# Patient Record
Sex: FEMALE | Race: WHITE | Employment: OTHER | RURAL
[De-identification: names, ages, dates, MRNs, and addresses within clinical notes are randomized per-mention and may not be internally consistent; named-entity substitution may affect disease eponyms.]

---

## 2017-02-16 ENCOUNTER — OFFICE VISIT (OUTPATIENT)
Dept: FAMILY MEDICINE CLINIC | Age: 82
End: 2017-02-16

## 2017-02-16 VITALS
RESPIRATION RATE: 20 BRPM | HEIGHT: 62 IN | WEIGHT: 169 LBS | BODY MASS INDEX: 31.1 KG/M2 | TEMPERATURE: 97.1 F | DIASTOLIC BLOOD PRESSURE: 84 MMHG | SYSTOLIC BLOOD PRESSURE: 138 MMHG | HEART RATE: 68 BPM | OXYGEN SATURATION: 94 %

## 2017-02-16 DIAGNOSIS — Z00.00 ROUTINE GENERAL MEDICAL EXAMINATION AT A HEALTH CARE FACILITY: ICD-10-CM

## 2017-02-16 DIAGNOSIS — Z13.39 SCREENING FOR ALCOHOLISM: ICD-10-CM

## 2017-02-16 DIAGNOSIS — Z23 ENCOUNTER FOR IMMUNIZATION: Primary | ICD-10-CM

## 2017-02-16 NOTE — PATIENT INSTRUCTIONS
A Healthy Lifestyle: Care Instructions  Your Care Instructions  A healthy lifestyle can help you feel good, stay at a healthy weight, and have plenty of energy for both work and play. A healthy lifestyle is something you can share with your whole family. A healthy lifestyle also can lower your risk for serious health problems, such as high blood pressure, heart disease, and diabetes. You can follow a few steps listed below to improve your health and the health of your family. Follow-up care is a key part of your treatment and safety. Be sure to make and go to all appointments, and call your doctor if you are having problems. Its also a good idea to know your test results and keep a list of the medicines you take. How can you care for yourself at home? · Do not eat too much sugar, fat, or fast foods. You can still have dessert and treats now and then. The goal is moderation. · Start small to improve your eating habits. Pay attention to portion sizes, drink less juice and soda pop, and eat more fruits and vegetables. ¨ Eat a healthy amount of food. A 3-ounce serving of meat, for example, is about the size of a deck of cards. Fill the rest of your plate with vegetables and whole grains. ¨ Limit the amount of soda and sports drinks you have every day. Drink more water when you are thirsty. ¨ Eat at least 5 servings of fruits and vegetables every day. It may seem like a lot, but it is not hard to reach this goal. A serving or helping is 1 piece of fruit, 1 cup of vegetables, or 2 cups of leafy, raw vegetables. Have an apple or some carrot sticks as an afternoon snack instead of a candy bar. Try to have fruits and/or vegetables at every meal.  · Make exercise part of your daily routine. You may want to start with simple activities, such as walking, bicycling, or slow swimming. Try to be active 30 to 60 minutes every day. You do not need to do all 30 to 60 minutes all at once.  For example, you can exercise 3 times a day for 10 or 20 minutes. Moderate exercise is safe for most people, but it is always a good idea to talk to your doctor before starting an exercise program.  · Keep moving. Ramesh Northern the lawn, work in the garden, or Nanali. Take the stairs instead of the elevator at work. · If you smoke, quit. People who smoke have an increased risk for heart attack, stroke, cancer, and other lung illnesses. Quitting is hard, but there are ways to boost your chance of quitting tobacco for good. ¨ Use nicotine gum, patches, or lozenges. ¨ Ask your doctor about stop-smoking programs and medicines. ¨ Keep trying. In addition to reducing your risk of diseases in the future, you will notice some benefits soon after you stop using tobacco. If you have shortness of breath or asthma symptoms, they will likely get better within a few weeks after you quit. · Limit how much alcohol you drink. Moderate amounts of alcohol (up to 2 drinks a day for men, 1 drink a day for women) are okay. But drinking too much can lead to liver problems, high blood pressure, and other health problems. Family health  If you have a family, there are many things you can do together to improve your health. · Eat meals together as a family as often as possible. · Eat healthy foods. This includes fruits, vegetables, lean meats and dairy, and whole grains. · Include your family in your fitness plan. Most people think of activities such as jogging or tennis as the way to fitness, but there are many ways you and your family can be more active. Anything that makes you breathe hard and gets your heart pumping is exercise. Here are some tips:  ¨ Walk to do errands or to take your child to school or the bus. ¨ Go for a family bike ride after dinner instead of watching TV. Where can you learn more? Go to http://ebony-fatoumata.info/. Enter A330 in the search box to learn more about \"A Healthy Lifestyle: Care Instructions. \"  Current as of: July 26, 2016  Content Version: 11.1  © 4880-6890 [employer], Use[employer]ogether. Care instructions adapted under license by [employer] ([employer]) (which disclaims liability or warranty for this information). If you have questions about a medical condition or this instruction, always ask your healthcare professional. [address] any warranty or liability for your use of this information. Medicare Wellness Visit, Female    The best way to live healthy is to have a healthy lifestyle by eating a well-balanced diet, exercising regularly, limiting alcohol and stopping smoking. Regular physical exams and screening tests are another way to keep healthy. Preventive exams provided by your health care provider can find health problems before they become diseases or illnesses. Preventive services including immunizations, screening tests, monitoring and exams can help you take care of your own health. All people over age 72 should have a pneumovax  and and a prevnar shot to prevent pneumonia. These are once in a lifetime unless you and your provider decide differently. All people over 65 should have a yearly flu shot and a tetanus vaccine every 10 years. A bone mass density to screen for osteoporosis or thinning of the bones should be done every 2 years after 65. Screening for diabetes mellitus with a blood sugar test should be done every year. Glaucoma is a disease of the eye due to increased ocular pressure that can lead to blindness and it should be done every year by an eye professional.    Cardiovascular screening tests that check for elevated lipids (fatty part of blood) which can lead to heart disease and strokes should be done every 5 years. Colorectal screening that evaluates for blood or polyps in your colon should be done yearly as a stool test or every five years as a flexible sigmoidoscope or every 10 years as a colonoscopy up to age 76.     Breast cancer screening with a mammogram is recommended biennially  for women age 54-69. Screening for cervical cancer with a pap smear and pelvic exam is recommended for women after age 72 years every 2 years up to age 79 or when the provider and patient decide to stop. If there is a history of cervical abnormalities or other increased risk for cancer then the test is recommended yearly. Hepatitis C screening is also recommended for anyone born between [address] [place] [address] through [place] [address]. A shingles vaccine is also recommended once in a lifetime after age 61. Your Medicare Wellness Exam is recommended annually.     Here is a list of your current Health Maintenance items with a due date:  Health Maintenance Due   Topic Date Due    DTaP/Tdap/Td  (1 - Tdap) 03/16/1956    Shingles Vaccine  03/16/1995    Bone Density Screening  03/16/2000    Annual Well Visit  03/16/2000    Flu Vaccine  08/01/2016

## 2017-02-16 NOTE — PROGRESS NOTES
This is a Subsequent Medicare Annual Wellness Visit providing Personalized Prevention Plan Services (PPPS) (Performed 12 months after initial AWV and PPPS )    I have reviewed the patient's medical history in detail and updated the computerized patient record. History     Past Medical History   Diagnosis Date    Anemia     Arthritis     GERD (gastroesophageal reflux disease)     HLD (hyperlipidemia)     Hypertension     Stroke ([facility] [address]     Thyroid disease      thinks had partial resection? Not on meds and normal TSH 10/2016      Past Surgical History   Procedure Laterality Date    Hx hysterectomy       Can't remember why but is sure she never had cancer. Current Outpatient Prescriptions   Medication Sig Dispense Refill    atorvastatin (LIPITOR) 40 mg tablet Take 1 Tab by mouth daily. 30 Tab 5    carvedilol (COREG) 6.25 mg tablet Take 1 Tab by mouth two (2) times daily (with meals). 60 Tab 5    pantoprazole (PROTONIX) 40 mg tablet Take 1 Tab by mouth daily. 30 Tab 5    amLODIPine (NORVASC) 10 mg tablet Take 1 Tab by mouth nightly. 30 Tab 5    ascorbic acid (VITAMIN C) 250 mg tablet Take 1 Tab by mouth two (2) times daily (with meals). 60 Tab 3    aspirin 81 mg chewable tablet Take 1 Tab by mouth daily. 100 Tab 3    ferrous sulfate 325 mg (65 mg iron) tablet Take 1 Tab by mouth two (2) times daily (with meals). 60 Tab 3    therapeutic multivitamin (THERAGRAN) tablet Take 1 Tab by mouth daily.  acetaminophen (TYLENOL ARTHRITIS PAIN) 650 mg CR tablet Take 650 mg by mouth every six (6) hours as needed for Pain.        No Known Allergies  Family History   Problem Relation Age of Onset    Cancer Mother      colon    Alzheimer Mother     Cancer Father      bladder    Cancer Sister      not sure what kind    Cancer Brother      not sure what kind    Heart Disease Maternal Grandmother     Stroke Other      at young age     Social History   Substance Use Topics    Smoking status: Never Smoker    Smokeless tobacco: Never Used    Alcohol use No     Patient Active Problem List   Diagnosis Code    GERD (gastroesophageal reflux disease) K21.9    Hypertension I10    Arthritis M19.90    GI bleed K92.2    Symptomatic anemia D64.9    Cerebral thrombosis with cerebral infarction ([facility] [address] I63.30    Right hemiparesis ([facility] [address] G81.91    On statin therapy Z79.899    Antiplatelet or antithrombotic long-term use Z79.02       Depression Risk Factor Screening:     PHQ 2 / 9, over the last two weeks 2017   Little interest or pleasure in doing things Not at all   Feeling down, depressed or hopeless Not at all   Total Score PHQ 2 0     Alcohol Risk Factor Screening: On any occasion during the past 3 months, have you had more than 3 drinks containing alcohol? No    Do you average more than 7 drinks per week? No      Functional Ability and Level of Safety:     Hearing Loss   none    Activities of Daily Living   Partial assistance.  helps her since her stroke. Requires assistance with: ambulation, bathing and hygiene and dressing    Fall Risk     Fall Risk Assessment, last 12 mths 2017   Able to walk? Yes   Fall in past 12 months? Yes   Fall with injury? No   Number of falls in past 12 months 1   Fall Risk Score 1     Abuse Screen   Patient is not abused    Physical Examination     Evaluation of Cognitive Function:  Mood/affect: \"so-so\"/appropriate - she appears happier and more interactive today than she has been in the past. She does report that her dog  last week. Appearance: age appropriate, well dressed and within normal Limits  Family member/caregiver input: Some problems getting her to eat healthy and getting her to exercise. These have been long-standing problems and we have been counseling her in regards to this at every visit. Health Maintenance:  Last mammogram: Can't remember, not interested.   thinks she has not had one in 50 years at least.   Last pap: No longer a candidate, hysterectomy for benign reasons  DEXA: Never, not interested  [place], [place]. Not interested in repeating. TDaP: Can't remember, not interested  PPSV23: 2014  PCV13: Never, not interested  Zostavax: Never, not interested  Flu: 2015, not interested      Patient Care Team:  Bam Glass MD as PCP - General (Family Practice)  Ross Seay MD (Neurology)    Advice/Referrals/Counseling   Education and counseling provided:  Are appropriate based on today's review and evaluation  End-of-Life planning (with patient's consent) - Pt's  states they have an advanced directive and medical POA, will be documents to next visit. Pneumococcal Vaccine  Influenza Vaccine  Screening Mammography  Bone mass measurement (DEXA) - strongly encouraged this test given her age, hysterectomy at an early age (thinks in her 29's) and race. She will continue to think about it. Assessment/Plan   79yo F for annual [address]  - Rx for TDaP and PCV13 given today  - Pt to call if she changes her mind about wanting to schedule a DEXA or mammogram  - RTC in 2 months for f/u chronic conditions.  Will bring copy of advanced directive to that visit

## 2017-02-16 NOTE — MR AVS SNAPSHOT
Visit Information Date & Time Provider Department Dept. Phone Encounter #  
 2/16/2017 10:20 AM Jerson Saunders MD Radha Slater [id number] Follow-up Instructions Return in 2 months (on 4/16/2017). Upcoming Health Maintenance Date Due DTaP/Tdap/Td series (1 - Tdap) 3/16/1956 ZOSTER VACCINE AGE 60> 3/16/1995 OSTEOPOROSIS SCREENING (DEXA) 3/16/2000 MEDICARE YEARLY EXAM 3/16/2000 INFLUENZA AGE 9 TO ADULT 8/1/2016 GLAUCOMA SCREENING Q2Y 8/20/2017 Pneumococcal 65+ High/Highest Risk (2 of 2 - PPSV23) 2/3/2019 Allergies as of 2/16/2017  Review Complete On: 2/16/2017 By: Mykel Avelar LPN No Known Allergies Current Immunizations  Reviewed on 6/15/2015 Name Date Pneumococcal Vaccine (Unspecified Type) 2/3/2014 Not reviewed this visit You Were Diagnosed With   
  
 Codes Comments Encounter for immunization    -  Primary ICD-10-CM: O95 ICD-9-CM: V03.89 Routine general medical examination at a health care facility     ICD-10-CM: Z00.00 ICD-9-CM: V70.0 Screening for alcoholism     ICD-10-CM: Z13.89 ICD-9-CM: V79.1 Vitals BP Pulse Temp Resp Height(growth percentile) Weight(growth percentile) 138/84 (BP 1 Location: Left arm, BP Patient Position: Sitting) 68 97.1 °F (36.2 °C) (Oral) 20 5' 2\" (1.575 m) 169 lb (76.7 kg) SpO2 BMI OB Status Smoking Status 94% 30.91 kg/m2 Hysterectomy Never Smoker Vitals History BMI and BSA Data Body Mass Index Body Surface Area 30.91 kg/m 2 1.83 m 2 Preferred Pharmacy Pharmacy Name Phone [place] [place] [facility] [address] [facility] [address] [phone] Your Updated Medication List  
  
   
This list is accurate as of: 2/16/17 11:08 AM.  Always use your most recent med list. amLODIPine 10 mg tablet Commonly known as:  Lulu Del Take 1 Tab by mouth nightly. ascorbic acid (vitamin C) 250 mg tablet Commonly known as:  VITAMIN C Take 1 Tab by mouth two (2) times daily (with meals). aspirin 81 mg chewable tablet Take 1 Tab by mouth daily. atorvastatin 40 mg tablet Commonly known as:  LIPITOR Take 1 Tab by mouth daily. carvedilol 6.25 mg tablet Commonly known as:  Gordon Pintos Take 1 Tab by mouth two (2) times daily (with meals). Diphth, Pertus(Acell), Tetanus 2.5-8-5 Lf-mcg-Lf/0.5mL Susp susp Commonly known as:  BOOSTRIX TDAP  
0.5 mL by IntraMUSCular route once for 1 dose. ferrous sulfate 325 mg (65 mg iron) tablet Take 1 Tab by mouth two (2) times daily (with meals). pantoprazole 40 mg tablet Commonly known as:  PROTONIX Take 1 Tab by mouth daily. pneumococcal 13 grayson conj dip 0.5 mL Syrg injection Commonly known as:  PREVNAR-13  
0.5 mL by IntraMUSCular route once for 1 dose. therapeutic multivitamin tablet Commonly known as:  [facility] Take 1 Tab by mouth daily. TYLENOL ARTHRITIS PAIN 650 mg CR tablet Generic drug:  acetaminophen Take 650 mg by mouth every six (6) hours as needed for Pain. Prescriptions Printed Refills Paige Dial,, Tetanus (BOOSTRIX TDAP) 2.5-8-5 Lf-mcg-Lf/0.5mL susp susp 0 Si.5 mL by IntraMUSCular route once for 1 dose. Class: Print Route: IntraMUSCular  
 pneumococcal 13 grayson conj dip (PREVNAR-13) 0.5 mL syrg injection 0 Si.5 mL by IntraMUSCular route once for 1 dose. Class: Print Route: IntraMUSCular Follow-up Instructions Return in 2 months (on 2017). Patient Instructions A Healthy Lifestyle: Care Instructions Your Care Instructions A healthy lifestyle can help you feel good, stay at a healthy weight, and have plenty of energy for both work and play. A healthy lifestyle is something you can share with your whole family. A healthy lifestyle also can lower your risk for serious health problems, such as high blood pressure, heart disease, and diabetes. You can follow a few steps listed below to improve your health and the health of your family. Follow-up care is a key part of your treatment and safety. Be sure to make and go to all appointments, and call your doctor if you are having problems. Its also a good idea to know your test results and keep a list of the medicines you take. How can you care for yourself at home? · Do not eat too much sugar, fat, or fast foods. You can still have dessert and treats now and then. The goal is moderation. · Start small to improve your eating habits. Pay attention to portion sizes, drink less juice and soda pop, and eat more fruits and vegetables. ¨ Eat a healthy amount of food. A 3-ounce serving of meat, for example, is about the size of a deck of cards. Fill the rest of your plate with vegetables and whole grains. ¨ Limit the amount of soda and sports drinks you have every day. Drink more water when you are thirsty. ¨ Eat at least 5 servings of fruits and vegetables every day. It may seem like a lot, but it is not hard to reach this goal. A serving or helping is 1 piece of fruit, 1 cup of vegetables, or 2 cups of leafy, raw vegetables. Have an apple or some carrot sticks as an afternoon snack instead of a candy bar. Try to have fruits and/or vegetables at every meal. 
· Make exercise part of your daily routine. You may want to start with simple activities, such as walking, bicycling, or slow swimming. Try to be active 30 to 60 minutes every day. You do not need to do all 30 to 60 minutes all at once. For example, you can exercise 3 times a day for 10 or 20 minutes. Moderate exercise is safe for most people, but it is always a good idea to talk to your doctor before starting an exercise program. 
· Keep moving. Darren Hu the lawn, work in the garden, or [place].  Take the stairs instead of the elevator at work. · If you smoke, quit. People who smoke have an increased risk for heart attack, stroke, cancer, and other lung illnesses. Quitting is hard, but there are ways to boost your chance of quitting tobacco for good. ¨ Use nicotine gum, patches, or lozenges. ¨ Ask your doctor about stop-smoking programs and medicines. ¨ Keep trying. In addition to reducing your risk of diseases in the future, you will notice some benefits soon after you stop using tobacco. If you have shortness of breath or asthma symptoms, they will likely get better within a few weeks after you quit. · Limit how much alcohol you drink. Moderate amounts of alcohol (up to 2 drinks a day for men, 1 drink a day for women) are okay. But drinking too much can lead to liver problems, high blood pressure, and other health problems. Family health If you have a family, there are many things you can do together to improve your health. · Eat meals together as a family as often as possible. · Eat healthy foods. This includes fruits, vegetables, lean meats and dairy, and whole grains. · Include your family in your fitness plan. Most people think of activities such as jogging or tennis as the way to fitness, but there are many ways you and your family can be more active. Anything that makes you breathe hard and gets your heart pumping is exercise. Here are some tips: 
¨ Walk to do errands or to take your child to school or the bus. ¨ Go for a family bike ride after dinner instead of watching TV. Where can you learn more? Go to http://ebony-fatoumata.info/. Enter B868 in the search box to learn more about \"A Healthy Lifestyle: Care Instructions. \" Current as of: July 26, 2016 Content Version: 11.1 © 3933-0600 [employer], [employer].  Care instructions adapted under license by [employer] (which disclaims liability or warranty for this information). If you have questions about a medical condition or this instruction, always ask your healthcare professional. [place] [address] any warranty or liability for your use of this information. Medicare Wellness Visit, Female The best way to live healthy is to have a healthy lifestyle by eating a well-balanced diet, exercising regularly, limiting alcohol and stopping smoking. Regular physical exams and screening tests are another way to keep healthy. Preventive exams provided by your health care provider can find health problems before they become diseases or illnesses. Preventive services including immunizations, screening tests, monitoring and exams can help you take care of your own health. All people over age 72 should have a pneumovax  and and a prevnar shot to prevent pneumonia. These are once in a lifetime unless you and your provider decide differently. All people over 65 should have a yearly flu shot and a tetanus vaccine every 10 years. A bone mass density to screen for osteoporosis or thinning of the bones should be done every 2 years after 65. Screening for diabetes mellitus with a blood sugar test should be done every year. Glaucoma is a disease of the eye due to increased ocular pressure that can lead to blindness and it should be done every year by an eye professional. 
 
Cardiovascular screening tests that check for elevated lipids (fatty part of blood) which can lead to heart disease and strokes should be done every 5 years. Colorectal screening that evaluates for blood or polyps in your colon should be done yearly as a stool test or every five years as a flexible sigmoidoscope or every 10 years as a colonoscopy up to age 76. Breast cancer screening with a mammogram is recommended biennially  for women age 54-69.  
 
Screening for cervical cancer with a pap smear and pelvic exam is recommended for women after age 72 years every 2 years up to age 79 or when the provider and patient decide to stop. If there is a history of cervical abnormalities or other increased risk for cancer then the test is recommended yearly. Hepatitis C screening is also recommended for anyone born between 80 through [address]. A shingles vaccine is also recommended once in a lifetime after age 61. Your Medicare Wellness Exam is recommended annually. Here is a list of your current Health Maintenance items with a due date: 
Health Maintenance Due Topic Date Due  
 DTaP/Tdap/Td  (1 - Tdap) 03[birth year]16/1956  Shingles Vaccine  03/16/1995  Bone Density Screening  03/16/2000 [facility] Annual Well Visit  03/16/2000  Flu Vaccine  08/01/2016 Introducing [facility]! Dear Charise Angelucci: Thank you for requesting a Maker[employer] account. Our records indicate that you have previously registered for a Maker[employer] account but its currently inactive. Please call our Maker[employer] support line at [phone]. Additional Information If you have questions, please visit the Frequently Asked Questions section of the Maker[employer] website at [email or web address] Remember, MakerBot is NOT to be used for urgent needs. For medical emergencies, dial 911. Now available from your iPhone and Android! Please provide this summary of care documentation to your next provider. Your primary care clinician is listed as [address]. If you have any questions after today's visit, please call [phone].

## 2017-02-16 NOTE — PROGRESS NOTES
Reviewed record in preparation for visit and have obtained necessary documentation. Patient did not bring medications to visit for review. Information provided on Advanced Directive, Living Will. Body mass index is 30.91 kg/(m^2).    Health Maintenance Due   Topic Date Due    DTaP/Tdap/Td series (1 - Tdap) 03/16/1956    ZOSTER VACCINE AGE 60>  03/16/1995    OSTEOPOROSIS SCREENING (DEXA)  03/16/2000    MEDICARE YEARLY EXAM  03/16/2000    INFLUENZA AGE 9 TO ADULT  08/01/2016

## 2017-04-07 DIAGNOSIS — I10 ESSENTIAL HYPERTENSION: ICD-10-CM

## 2017-04-07 RX ORDER — CARVEDILOL 6.25 MG/1
6.25 TABLET ORAL 2 TIMES DAILY WITH MEALS
Qty: 60 TAB | Refills: 5 | Status: SHIPPED | OUTPATIENT
Start: 2017-04-07 | End: 2017-10-12 | Stop reason: SDUPTHER

## 2017-04-14 DIAGNOSIS — I10 ESSENTIAL HYPERTENSION: ICD-10-CM

## 2017-04-14 RX ORDER — AMLODIPINE BESYLATE 10 MG/1
10 TABLET ORAL
Qty: 30 TAB | Refills: 5 | Status: SHIPPED | OUTPATIENT
Start: 2017-04-14 | End: 2017-07-31 | Stop reason: SDUPTHER

## 2017-04-14 RX ORDER — AMLODIPINE BESYLATE 10 MG/1
10 TABLET ORAL DAILY
Qty: 30 TAB | Status: CANCELLED | OUTPATIENT
Start: 2017-04-14

## 2017-04-19 DIAGNOSIS — K92.1 GASTROINTESTINAL HEMORRHAGE WITH MELENA: ICD-10-CM

## 2017-04-19 DIAGNOSIS — K44.9 HIATAL HERNIA: ICD-10-CM

## 2017-04-19 RX ORDER — PANTOPRAZOLE SODIUM 40 MG/1
40 TABLET, DELAYED RELEASE ORAL DAILY
Qty: 30 TAB | Refills: 5 | Status: SHIPPED | OUTPATIENT
Start: 2017-04-19 | End: 2017-10-30 | Stop reason: SDUPTHER

## 2017-06-15 ENCOUNTER — OFFICE VISIT (OUTPATIENT)
Dept: FAMILY MEDICINE CLINIC | Age: 82
End: 2017-06-15

## 2017-06-15 VITALS
BODY MASS INDEX: 30.55 KG/M2 | TEMPERATURE: 97.5 F | WEIGHT: 166 LBS | OXYGEN SATURATION: 94 % | SYSTOLIC BLOOD PRESSURE: 127 MMHG | DIASTOLIC BLOOD PRESSURE: 88 MMHG | RESPIRATION RATE: 20 BRPM | HEIGHT: 62 IN

## 2017-06-15 DIAGNOSIS — I10 ESSENTIAL HYPERTENSION: Primary | ICD-10-CM

## 2017-06-15 DIAGNOSIS — E78.2 MIXED HYPERLIPIDEMIA: ICD-10-CM

## 2017-06-15 DIAGNOSIS — Z78.0 POSTMENOPAUSAL: ICD-10-CM

## 2017-06-15 RX ORDER — ATORVASTATIN CALCIUM 40 MG/1
40 TABLET, FILM COATED ORAL DAILY
Qty: 30 TAB | Refills: 5 | Status: SHIPPED | OUTPATIENT
Start: 2017-06-15 | End: 2017-08-01 | Stop reason: SDUPTHER

## 2017-06-15 NOTE — PROGRESS NOTES
Reviewed record in preparation for visit and have necessary documentation  Pt did not bring medication to office visit for review  Information was given to pt on Advanced Directives, Living Will  Information was given on Shingles Vaccine  opportunity was given for questions  Goals that were addressed and/or need to be completed during or after this appointment include     Health Maintenance Due   Topic Date Due    ZOSTER VACCINE AGE 60>  03/16/1995    OSTEOPOROSIS SCREENING (DEXA)  03/16/2000

## 2017-06-15 NOTE — PROGRESS NOTES
CC: f/u HTN, HLD    HPI: Pt is a 80 y.o. female who presents for f/u HTN, HLD. HTN:  Checking BPs at home?: YES, occasionally  Logs today?: NO  Range of BPs?: Unsure  Adding salt to foods?: NO  Headaches?: NO  Blurry vision?: YES - chronic, 2/2 cataracts, unchanged  Lower extremity edema?: NO  Smoking?: NO      Past Medical History:   Diagnosis Date    Anemia     Arthritis     GERD (gastroesophageal reflux disease)     HLD (hyperlipidemia)     Hypertension     Stroke ([facility] [address]     Thyroid disease     thinks had partial resection? Not on meds and normal TSH 10/2016       Family History   Problem Relation Age of Onset    Cancer Mother      colon    Alzheimer Mother     Cancer Father      bladder    Cancer Sister      not sure what kind    Cancer Brother      not sure what kind    Heart Disease Maternal Grandmother     Stroke Other      at young age       Social History   Substance Use Topics    Smoking status: Never Smoker    Smokeless tobacco: Never Used    Alcohol use No       ROS:  Positive only when bolded  Constitutional: HA  Eyes: Changes in vision  Respiratory: SOB, wheezing, cough  Cardiovascular: CP, palpitations  Hematologic/lymphatic: MAXIMUS    PE:  Visit Vitals    /88 (BP 1 Location: Right arm, BP Patient Position: Sitting)    Temp 97.5 °F (36.4 °C) (Oral)    Resp 20    Ht 5' 2\" (1.575 m)    Wt 166 lb (75.3 kg)    SpO2 94%    BMI 30.36 kg/m2     Gen: Pt sitting in chair, in NAD  Head: Normocephalic, atraumatic  Eyes: Sclera anicteric, EOM grossly intact, PERRL  Throat: MMM, normal lips, tongue and gums  Neck: Supple, no LAD, no carotid bruits  CVS: Normal S1, S2, no m/r/g  Resp: CTAB, no wheezes or rales  Extrem: Atraumatic, no cyanosis or edema  Pulses: 2+   Skin: Warm, dry  Neuro: Alert, appropriate. Hard of hearing. A/P: Pt is a 80 y.o. female who presents for f/u HTN and HLD.    - BMP  - Lipid panel  - Referral for DEXA  - Pt declines Zostavax  - RTC in 6 months for f/u chronic conditions      Discussed diagnoses in detail with patient. Medication risks/benefits/side effects discussed with patient. All of the patient's questions were addressed. The patient understands and agrees with our plan of care. The patient knows to call back if they are unsure of or forget any changes we discussed today or if the symptoms change. The patient received an After-Visit Summary which contains VS, orders, medication list and allergy list. This can be used as a \"mini-medical record\" should they have to seek medical care while out of town. Current Outpatient Prescriptions on File Prior to Visit   Medication Sig Dispense Refill    pantoprazole (PROTONIX) 40 mg tablet Take 1 Tab by mouth daily. 30 Tab 5    amLODIPine (NORVASC) 10 mg tablet Take 1 Tab by mouth nightly. 30 Tab 5    carvedilol (COREG) 6.25 mg tablet Take 1 Tab by mouth two (2) times daily (with meals). 60 Tab 5    atorvastatin (LIPITOR) 40 mg tablet Take 1 Tab by mouth daily. 30 Tab 5    ascorbic acid (VITAMIN C) 250 mg tablet Take 1 Tab by mouth two (2) times daily (with meals). 60 Tab 3    aspirin 81 mg chewable tablet Take 1 Tab by mouth daily. 100 Tab 3    ferrous sulfate 325 mg (65 mg iron) tablet Take 1 Tab by mouth two (2) times daily (with meals). 60 Tab 3    therapeutic multivitamin (THERAGRAN) tablet Take 1 Tab by mouth daily.  acetaminophen (TYLENOL ARTHRITIS PAIN) 650 mg CR tablet Take 650 mg by mouth every six (6) hours as needed for Pain. No current facility-administered medications on file prior to visit.

## 2017-06-15 NOTE — PATIENT INSTRUCTIONS
Dual Energy X-ray Absorptiometry (DEXA) Test:    About This Test    What is it? Dual Energy X-ray Absorptiometry (DEXA) is a test that uses two different X-ray beams to check bone thickness (density) in your spine and hip. This information is used to estimate the strength of your bones. Why is this test done? A DEXA test is done to check for bone thinning and weakness, which can make it easier for you to break a bone. It is often done for:  People who are at risk for osteoporosis:   Women over age 72. People who take some medications, such as corticosteroids. People who have certain medical conditions, such as hyperparathyroidism. People who have osteoporosis, to see how well treatment is working. What happens before the test?  Women who are pregnant should not have this test. Let your doctor know if you are or might be pregnant. You may be able to leave your clothes on for the test, but you will remove any metal buttons or argenis for the test.  What happens during the test?  You will lie down on your back on a padded table. You may need to lie with your legs straight or with your lower legs resting on a platform built into the table. The machine will scan your bones and measure the amount of radiation they absorb. During this test you are exposed to a very low dose of radiation. How long does the test take? The test will take about 20 minutes. What happens after the test?  You will probably be able to go home right away. You can go back to your usual activities right away. When should you call for help? Watch closely for changes in your health, and be sure to contact your doctor if you have any problems. Follow-up care is a key part of your treatment and safety. Be sure to make and go to all appointments, and call your doctor if you are having problems. It's also a good idea to keep a list of the medicines you take. Ask your doctor when you can expect to have your   test results.     Where can you learn more? Go to [employer]be  Enter P215 in the search box to learn more about \"Dual Energy X-ray Absorptiometry (DEXA) Test: About This Test\". © 2006 - 2009 Healthwise, Incorporated. Care instructions adapted under license by Jacky Durand (which disclaims liability or warranty for this information) . This care instruction is for use with your licensed healthcare professional. If you have questions about a medical condition or this instruction, always ask your healthcare professional. Elfida Lowers any warranty or liability for your use of this information.

## 2017-06-15 NOTE — MR AVS SNAPSHOT
Visit Information Date & Time Provider Department Dept. Phone Encounter #  
 6/15/2017 11:10 AM Calvin Hill MD [address] [place] [address] [id number] Follow-up Instructions Return in about 6 months (around 12/15/2017) for f/u chronic conditions. Your Appointments 6/15/2017 11:10 AM  
ROUTINE CARE with Calvin Hill MD  
[address] [facility] ([address]) Appt Note: robertien appt [facility] bloodwork [address] [address]  
[place] [address] [address] Upcoming Health Maintenance Date Due ZOSTER VACCINE AGE 60> 3/16/1995 OSTEOPOROSIS SCREENING (DEXA) 3/16/2000 INFLUENZA AGE 9 TO ADULT 8/1/2017 GLAUCOMA SCREENING Q2Y 8/20/2017 MEDICARE YEARLY EXAM 2/17/2018 DTaP/Tdap/Td series (2 - Td) 2/21/2027 Allergies as of 6/15/2017  Review Complete On: 6/15/2017 By: Ori Galindo No Known Allergies Current Immunizations  Reviewed on 6/15/2015 Name Date Pneumococcal Conjugate (PCV-13)  Incomplete, 2/21/2017 Pneumococcal Vaccine (Unspecified Type) 2/3/2014 Tdap  Incomplete Not reviewed this visit You Were Diagnosed With   
  
 Codes Comments Essential hypertension    -  Primary ICD-10-CM: I10 
ICD-9-CM: 401.9 Mixed hyperlipidemia     ICD-10-CM: E78.2 ICD-9-CM: 272.2 Postmenopausal     ICD-10-CM: Z78.0 ICD-9-CM: V49.81 Vitals BP Temp Resp Height(growth percentile) Weight(growth percentile) SpO2  
 127/88 (BP 1 Location: Right arm, BP Patient Position: Sitting) 97.5 °F (36.4 °C) (Oral) 20 5' 2\" (1.575 m) 166 lb (75.3 kg) 94% BMI OB Status Smoking Status 30.36 kg/m2 Hysterectomy Never Smoker Vitals History BMI and BSA Data Body Mass Index Body Surface Area  
 30.36 kg/m 2 1.81 m 2 Preferred Pharmacy Pharmacy Name Phone [facility] [address] [place] [address] [phone] Your Updated Medication List  
  
   
This list is accurate as of: 6/15/17 10:44 AM.  Always use your most recent med list. amLODIPine 10 mg tablet Commonly known as:  Barabara Puls Take 1 Tab by mouth nightly. ascorbic acid (vitamin C) 250 mg tablet Commonly known as:  VITAMIN C Take 1 Tab by mouth two (2) times daily (with meals). aspirin 81 mg chewable tablet Take 1 Tab by mouth daily. atorvastatin 40 mg tablet Commonly known as:  LIPITOR Take 1 Tab by mouth daily. carvedilol 6.25 mg tablet Commonly known as:  Render Blonder Take 1 Tab by mouth two (2) times daily (with meals). ferrous sulfate 325 mg (65 mg iron) tablet Take 1 Tab by mouth two (2) times daily (with meals). pantoprazole 40 mg tablet Commonly known as:  PROTONIX Take 1 Tab by mouth daily. therapeutic multivitamin tablet Commonly known as:  [facility] Take 1 Tab by mouth daily. TYLENOL ARTHRITIS PAIN 650 mg CR tablet Generic drug:  acetaminophen Take 650 mg by mouth every six (6) hours as needed for Pain. Prescriptions Sent to Pharmacy Refills  
 atorvastatin (LIPITOR) 40 mg tablet 5 Sig: Take 1 Tab by mouth daily. Class: Normal  
 Pharmacy: [address] #: [phone] Route: Oral  
  
We Performed the Following LIPID PANEL [27167 CPT(R)] METABOLIC PANEL, COMPREHENSIVE [59705 CPT(R)] Follow-up Instructions Return in about 6 months (around 12/15/2017) for f/u chronic conditions. To-Do List   
 06/16/2017 Imaging:  DEXA BONE DENSITY STUDY AXIAL Patient Instructions Dual Energy X-ray Absorptiometry (DEXA) Test: 
 
About This Test 
 
What is it?  
Dual Energy X-ray Absorptiometry (DEXA) is a test that uses two different X-ray beams to check bone thickness (density) in your spine and hip. This information is used to estimate the strength of your bones. Why is this test done? A DEXA test is done to check for bone thinning and weakness, which can make it easier for you to break a bone. It is often done for: 
People who are at risk for osteoporosis:  
Women over age 72. People who take some medications, such as corticosteroids. People who have certain medical conditions, such as hyperparathyroidism. People who have osteoporosis, to see how well treatment is working. What happens before the test? 
Women who are pregnant should not have this test. Let your doctor know if you are or might be pregnant. You may be able to leave your clothes on for the test, but you will remove any metal buttons or argenis for the test. 
What happens during the test? 
You will lie down on your back on a padded table. You may need to lie with your legs straight or with your lower legs resting on a platform built into the table. The machine will scan your bones and measure the amount of radiation they absorb. During this test you are exposed to a very low dose of radiation. How long does the test take? The test will take about 20 minutes. What happens after the test? 
You will probably be able to go home right away. You can go back to your usual activities right away. When should you call for help? Watch closely for changes in your health, and be sure to contact your doctor if you have any problems. Follow-up care is a key part of your treatment and safety. Be sure to make and go to all appointments, and call your doctor if you are having problems. It's also a good idea to keep a list of the medicines you take. Ask your doctor when you can expect to have your 
 test results. Where can you learn more? Go to GoPlanit.be Enter O869 in the search box to learn more about \"Dual Energy X-ray Absorptiometry (DEXA) Test: About This Test\". © 2006 - 2009 Healthwise, Incorporated. Care instructions adapted under license by [facility] (which disclaims liability or warranty for this information) . This care instruction is for use with your licensed healthcare professional. If you have questions about a medical condition or this instruction, always ask your healthcare professional. [employer] any warranty or liability for your use of this information. Please provide this summary of care documentation to your next provider. Your primary care clinician is listed as [address] If you have any questions after today's visit, please call [phone].

## 2017-06-16 ENCOUNTER — TELEPHONE (OUTPATIENT)
Dept: FAMILY MEDICINE CLINIC | Age: 82
End: 2017-06-16

## 2017-06-16 LAB
ALBUMIN SERPL-MCNC: 4.2 G/DL (ref 3.5–4.7)
ALBUMIN/GLOB SERPL: 1.5 {RATIO} (ref 1.2–2.2)
ALP SERPL-CCNC: 178 IU/L (ref 39–117)
ALT SERPL-CCNC: 35 IU/L (ref 0–32)
AST SERPL-CCNC: 47 IU/L (ref 0–40)
BILIRUB SERPL-MCNC: 1.1 MG/DL (ref 0–1.2)
BUN SERPL-MCNC: 15 MG/DL (ref 8–27)
BUN/CREAT SERPL: 14 (ref 12–28)
CALCIUM SERPL-MCNC: 9.8 MG/DL (ref 8.7–10.3)
CHLORIDE SERPL-SCNC: 99 MMOL/L (ref 96–106)
CHOLEST SERPL-MCNC: 113 MG/DL (ref 100–199)
CO2 SERPL-SCNC: 24 MMOL/L (ref 18–29)
CREAT SERPL-MCNC: 1.06 MG/DL (ref 0.57–1)
GLOBULIN SER CALC-MCNC: 2.8 G/DL (ref 1.5–4.5)
GLUCOSE SERPL-MCNC: 99 MG/DL (ref 65–99)
HDLC SERPL-MCNC: 42 MG/DL
LDLC SERPL CALC-MCNC: 30 MG/DL (ref 0–99)
POTASSIUM SERPL-SCNC: 4.3 MMOL/L (ref 3.5–5.2)
PROT SERPL-MCNC: 7 G/DL (ref 6–8.5)
SODIUM SERPL-SCNC: 142 MMOL/L (ref 134–144)
TRIGL SERPL-MCNC: 203 MG/DL (ref 0–149)
VLDLC SERPL CALC-MCNC: 41 MG/DL (ref 5–40)

## 2017-06-16 NOTE — TELEPHONE ENCOUNTER
Called to inform of recent lab results. Left message to call back. Kidney function is back to baseline, cholesterol looks ok. LFT's still elevated, would like to get Abd US as discussed previously. Her fasting glucose was at the upper limit of normal (not impaired fasting glucose), will continue to monitor with her regular lab-work.

## 2017-06-20 ENCOUNTER — TELEPHONE (OUTPATIENT)
Dept: FAMILY MEDICINE CLINIC | Age: 82
End: 2017-06-20

## 2017-06-20 DIAGNOSIS — R79.89 ELEVATED LFTS: Primary | ICD-10-CM

## 2017-06-20 NOTE — TELEPHONE ENCOUNTER
Called again to discuss results. Spoke with pt's , Romaine Mann, on HIPAA. They would like to get the Abd US done. Will order and call with results. All questions answered and pt's  feels comfortable with the plan of care.

## 2017-07-07 ENCOUNTER — TELEPHONE (OUTPATIENT)
Dept: FAMILY MEDICINE CLINIC | Age: 82
End: 2017-07-07

## 2017-07-07 DIAGNOSIS — R79.89 ELEVATED LFTS: ICD-10-CM

## 2017-07-07 DIAGNOSIS — Z78.0 POSTMENOPAUSAL: ICD-10-CM

## 2017-07-07 NOTE — TELEPHONE ENCOUNTER
Letter to patient informing her that the imaging orders have been faxed to [facility] for their office to contact the patient to schedule an appointment.   The phone number to their office in case you do not hear from them is [phone]

## 2017-07-07 NOTE — LETTER
7/7/2017 4:59 PM 
 
Ms. Arnulfo Ivey [address] [address] Dear Ms. Persaud: The imaging orders have been faxed to [facility] for their office to contact you to schedule an appointment. The phone number to their office in case you do not hear from them is [phone]. If you have any questions, please contact our office. Sincerely, Andi Wayne

## 2017-07-31 DIAGNOSIS — I10 ESSENTIAL HYPERTENSION: ICD-10-CM

## 2017-08-01 DIAGNOSIS — E78.2 MIXED HYPERLIPIDEMIA: ICD-10-CM

## 2017-08-01 RX ORDER — AMLODIPINE BESYLATE 10 MG/1
10 TABLET ORAL
Qty: 90 TAB | Refills: 2 | Status: SHIPPED | OUTPATIENT
Start: 2017-08-01 | End: 2018-05-21 | Stop reason: SDUPTHER

## 2017-08-01 RX ORDER — ATORVASTATIN CALCIUM 40 MG/1
40 TABLET, FILM COATED ORAL DAILY
Qty: 90 TAB | Refills: 1 | Status: SHIPPED | OUTPATIENT
Start: 2017-08-01 | End: 2017-11-20 | Stop reason: SDUPTHER

## 2017-10-12 DIAGNOSIS — I10 ESSENTIAL HYPERTENSION: ICD-10-CM

## 2017-10-12 RX ORDER — CARVEDILOL 6.25 MG/1
6.25 TABLET ORAL 2 TIMES DAILY WITH MEALS
Qty: 60 TAB | Refills: 5 | Status: CANCELLED | OUTPATIENT
Start: 2017-10-12

## 2017-10-13 RX ORDER — CARVEDILOL 6.25 MG/1
6.25 TABLET ORAL 2 TIMES DAILY WITH MEALS
Qty: 180 TAB | Refills: 1 | Status: SHIPPED | OUTPATIENT
Start: 2017-10-13 | End: 2018-04-09 | Stop reason: SDUPTHER

## 2017-10-30 DIAGNOSIS — K44.9 HIATAL HERNIA: ICD-10-CM

## 2017-10-30 DIAGNOSIS — K92.1 GASTROINTESTINAL HEMORRHAGE WITH MELENA: ICD-10-CM

## 2017-10-30 RX ORDER — PANTOPRAZOLE SODIUM 40 MG/1
40 TABLET, DELAYED RELEASE ORAL DAILY
Qty: 30 TAB | Refills: 5 | Status: CANCELLED | OUTPATIENT
Start: 2017-10-30

## 2017-10-31 RX ORDER — PANTOPRAZOLE SODIUM 40 MG/1
40 TABLET, DELAYED RELEASE ORAL DAILY
Qty: 90 TAB | Refills: 1 | Status: SHIPPED | OUTPATIENT
Start: 2017-10-31 | End: 2018-04-09 | Stop reason: SDUPTHER

## 2017-11-20 DIAGNOSIS — E78.2 MIXED HYPERLIPIDEMIA: ICD-10-CM

## 2017-11-21 RX ORDER — ATORVASTATIN CALCIUM 40 MG/1
40 TABLET, FILM COATED ORAL DAILY
Qty: 90 TAB | Refills: 1 | Status: SHIPPED | OUTPATIENT
Start: 2017-11-21 | End: 2018-02-16 | Stop reason: SDUPTHER

## 2018-02-16 DIAGNOSIS — E78.2 MIXED HYPERLIPIDEMIA: ICD-10-CM

## 2018-02-16 RX ORDER — ATORVASTATIN CALCIUM 40 MG/1
40 TABLET, FILM COATED ORAL DAILY
Qty: 90 TAB | Refills: 1 | Status: SHIPPED | OUTPATIENT
Start: 2018-02-16 | End: 2018-07-17 | Stop reason: SDUPTHER

## 2018-02-16 RX ORDER — ATORVASTATIN CALCIUM 40 MG/1
40 TABLET, FILM COATED ORAL DAILY
Qty: 90 TAB | Refills: 1 | Status: CANCELLED | OUTPATIENT
Start: 2018-02-16

## 2018-02-19 ENCOUNTER — OFFICE VISIT (OUTPATIENT)
Dept: FAMILY MEDICINE CLINIC | Age: 83
End: 2018-02-19

## 2018-02-19 VITALS
HEIGHT: 62 IN | OXYGEN SATURATION: 97 % | HEART RATE: 72 BPM | WEIGHT: 174 LBS | TEMPERATURE: 96.9 F | RESPIRATION RATE: 20 BRPM | DIASTOLIC BLOOD PRESSURE: 77 MMHG | SYSTOLIC BLOOD PRESSURE: 122 MMHG | BODY MASS INDEX: 32.02 KG/M2

## 2018-02-19 DIAGNOSIS — E78.2 MIXED HYPERLIPIDEMIA: ICD-10-CM

## 2018-02-19 DIAGNOSIS — Z13.39 SCREENING FOR ALCOHOLISM: ICD-10-CM

## 2018-02-19 DIAGNOSIS — L20.82 FLEXURAL ECZEMA: Primary | ICD-10-CM

## 2018-02-19 DIAGNOSIS — Z00.00 MEDICARE ANNUAL WELLNESS VISIT, SUBSEQUENT: ICD-10-CM

## 2018-02-19 DIAGNOSIS — Z13.31 SCREENING FOR DEPRESSION: ICD-10-CM

## 2018-02-19 DIAGNOSIS — I10 ESSENTIAL HYPERTENSION: ICD-10-CM

## 2018-02-19 PROBLEM — G30.1 LATE ONSET ALZHEIMER'S DISEASE WITHOUT BEHAVIORAL DISTURBANCE (HCC): Status: ACTIVE | Noted: 2018-02-19

## 2018-02-19 PROBLEM — F02.80 LATE ONSET ALZHEIMER'S DISEASE WITHOUT BEHAVIORAL DISTURBANCE (HCC): Status: ACTIVE | Noted: 2018-02-19

## 2018-02-19 RX ORDER — TRIAMCINOLONE ACETONIDE 0.25 MG/G
CREAM TOPICAL 2 TIMES DAILY
Qty: 15 G | Refills: 3 | Status: SHIPPED | OUTPATIENT
Start: 2018-02-19 | End: 2018-02-20 | Stop reason: SDUPTHER

## 2018-02-19 NOTE — MR AVS SNAPSHOT
Dottie Pulse 
 
 
 [address] [place] [address] [id number] [phone] Patient: Nghia Coleman MRN: [id number] ZCM:[birth year] Visit Information Date & Time Provider Department Dept. Phone Encounter #  
 2/19/2018  1:40 PM Romaine Joaquin  [facility] [phone] [id number] Upcoming Health Maintenance Date Due ZOSTER VACCINE AGE 60> 1/16/1995 OSTEOPOROSIS SCREENING (DEXA) 3/16/2000 Influenza Age 5 to Adult 8/1/2017 GLAUCOMA SCREENING Q2Y 8/20/2017 MEDICARE YEARLY EXAM 2/17/2018 DTaP/Tdap/Td series (2 - Td) 2/21/2027 Allergies as of 2/19/2018  Review Complete On: 2/19/2018 By: Sunil Trent LPN No Known Allergies Current Immunizations  Reviewed on 6/15/2015 Name Date Pneumococcal Conjugate (PCV-13)  Incomplete, 2/21/2017 Pneumococcal Vaccine (Unspecified Type) 2/3/2014 Tdap  Incomplete Not reviewed this visit You Were Diagnosed With   
  
 Codes Comments Flexural eczema    -  Primary ICD-10-CM: G18.64 ICD-9-CM: 691.8 Medicare annual wellness visit, subsequent     ICD-10-CM: Z00.00 ICD-9-CM: V70.0 Screening for alcoholism     ICD-10-CM: Z13.89 ICD-9-CM: V79.1 Screening for depression     ICD-10-CM: Z13.89 ICD-9-CM: V79.0 Mixed hyperlipidemia     ICD-10-CM: E78.2 ICD-9-CM: 272.2 Essential hypertension     ICD-10-CM: I10 
ICD-9-CM: 401.9 Vitals BP Pulse Temp Resp Height(growth percentile) Weight(growth percentile) 122/77 (BP 1 Location: Right arm, BP Patient Position: Sitting) 72 96.9 °F (36.1 °C) (Oral) 20 5' 2\" (1.575 m) 174 lb (78.9 kg) SpO2 BMI OB Status Smoking Status 97% 31.83 kg/m2 Hysterectomy Never Smoker Vitals History BMI and BSA Data Body Mass Index Body Surface Area  
 31.83 kg/m 2 1.86 m 2 Preferred Pharmacy Pharmacy Name Phone [address] [place] [address] [phone] Your Updated Medication List  
  
   
This list is accurate as of: 2/19/18  2:29 PM.  Always use your most recent med list. amLODIPine 10 mg tablet Commonly known as:  Boskassidy Shield Take 1 Tab by mouth nightly. ascorbic acid (vitamin C) 250 mg tablet Commonly known as:  VITAMIN C Take 1 Tab by mouth two (2) times daily (with meals). aspirin 81 mg chewable tablet Take 1 Tab by mouth daily. atorvastatin 40 mg tablet Commonly known as:  LIPITOR Take 1 Tab by mouth daily. carvedilol 6.25 mg tablet Commonly known as:  Laneta Rucks Take 1 Tab by mouth two (2) times daily (with meals). ferrous sulfate 325 mg (65 mg iron) tablet Take 1 Tab by mouth two (2) times daily (with meals). pantoprazole 40 mg tablet Commonly known as:  PROTONIX Take 1 Tab by mouth daily. therapeutic multivitamin tablet Commonly known as:  [facility] Take 1 Tab by mouth daily. triamcinolone acetonide 0.025 % topical cream  
Commonly known as:  KENALOG Apply  to affected area two (2) times a day. use thin layer TYLENOL ARTHRITIS PAIN 650 mg Elyce Boeck Generic drug:  acetaminophen Take 650 mg by mouth every six (6) hours as needed for Pain. Prescriptions Sent to Pharmacy Refills  
 triamcinolone acetonide (KENALOG) 0.025 % topical cream 3 Sig: Apply  to affected area two (2) times a day. use thin layer Class: Normal  
 Pharmacy: [facility] [address] JOHANA RIVERA [address] [place] [address] Ph #: [phone] Route: Topical  
  
We Performed the Following [facility] [[id number] [facility]] LIPID PANEL [94650 CPT(R)] METABOLIC PANEL, COMPREHENSIVE [74474 CPT(R)] FL ANNUAL ALCOHOL SCREEN 15 MIN [id number] HCP] Patient Instructions A Healthy Lifestyle: Care Instructions Your Care Instructions A healthy lifestyle can help you feel good, stay at a healthy weight, and have plenty of energy for both work and play. A healthy lifestyle is something you can share with your whole family. A healthy lifestyle also can lower your risk for serious health problems, such as high blood pressure, heart disease, and diabetes. You can follow a few steps listed below to improve your health and the health of your family. Follow-up care is a key part of your treatment and safety. Be sure to make and go to all appointments, and call your doctor if you are having problems. It's also a good idea to know your test results and keep a list of the medicines you take. How can you care for yourself at home? · Do not eat too much sugar, fat, or fast foods. You can still have dessert and treats now and then. The goal is moderation. · Start small to improve your eating habits. Pay attention to portion sizes, drink less juice and soda pop, and eat more fruits and vegetables. ¨ Eat a healthy amount of food. A 3-ounce serving of meat, for example, is about the size of a deck of cards. Fill the rest of your plate with vegetables and whole grains. ¨ Limit the amount of soda and sports drinks you have every day. Drink more water when you are thirsty. ¨ Eat at least 5 servings of fruits and vegetables every day. It may seem like a lot, but it is not hard to reach this goal. A serving or helping is 1 piece of fruit, 1 cup of vegetables, or 2 cups of leafy, raw vegetables. Have an apple or some carrot sticks as an afternoon snack instead of a candy bar. Try to have fruits and/or vegetables at every meal. 
· Make exercise part of your daily routine. You may want to start with simple activities, such as walking, bicycling, or slow swimming. Try to be active 30 to 60 minutes every day. You do not need to do all 30 to 60 minutes all at once.  For example, you can exercise 3 times a day for 10 or 20 minutes. Moderate exercise is safe for most people, but it is always a good idea to talk to your doctor before starting an exercise program. 
· Keep moving. Oren Bhaktaagi the lawn, work in the garden, or [employer]. Take the stairs instead of the elevator at work. · If you smoke, quit. People who smoke have an increased risk for heart attack, stroke, cancer, and other lung illnesses. Quitting is hard, but there are ways to boost your chance of quitting tobacco for good. ¨ Use nicotine gum, patches, or lozenges. ¨ Ask your doctor about stop-smoking programs and medicines. ¨ Keep trying. In addition to reducing your risk of diseases in the future, you will notice some benefits soon after you stop using tobacco. If you have shortness of breath or asthma symptoms, they will likely get better within a few weeks after you quit. · Limit how much alcohol you drink. Moderate amounts of alcohol (up to 2 drinks a day for men, 1 drink a day for women) are okay. But drinking too much can lead to liver problems, high blood pressure, and other health problems. Family health If you have a family, there are many things you can do together to improve your health. · Eat meals together as a family as often as possible. · Eat healthy foods. This includes fruits, vegetables, lean meats and dairy, and whole grains. · Include your family in your fitness plan. Most people think of activities such as jogging or tennis as the way to fitness, but there are many ways you and your family can be more active. Anything that makes you breathe hard and gets your heart pumping is exercise. Here are some tips: 
¨ Walk to do errands or to take your child to school or the bus. ¨ Go for a family bike ride after dinner instead of watching TV. Where can you learn more? Go to http://ebony-fatoumata.info/. Enter J856 in the search box to learn more about \"A Healthy Lifestyle: Care Instructions. \" Current as of: May 12, 2017 Content Version: 11.4 © 3902-3515 [employer]. Care instructions adapted under license by [employer] (which disclaims liability or warranty for this information). If you have questions about a medical condition or this instruction, always ask your healthcare professional. [address] any warranty or liability for your use of this information. Medicare Wellness Visit, Female The best way to live healthy is to have a healthy lifestyle by eating a well-balanced diet, exercising regularly, limiting alcohol and stopping smoking. Regular physical exams and screening tests are another way to keep healthy. Preventive exams provided by your health care provider can find health problems before they become diseases or illnesses. Preventive services including immunizations, screening tests, monitoring and exams can help you take care of your own health. All people over age 72 should have a pneumovax  and and a prevnar shot to prevent pneumonia. These are once in a lifetime unless you and your provider decide differently. All people over 65 should have a yearly flu shot and a tetanus vaccine every 10 years. A bone mass density to screen for osteoporosis or thinning of the bones should be done every 2 years after 65. Screening for diabetes mellitus with a blood sugar test should be done every year. Glaucoma is a disease of the eye due to increased ocular pressure that can lead to blindness and it should be done every year by an eye professional. 
 
Cardiovascular screening tests that check for elevated lipids (fatty part of blood) which can lead to heart disease and strokes should be done every 5 years. Colorectal screening that evaluates for blood or polyps in your colon should be done yearly as a stool test or every five years as a flexible sigmoidoscope or every 10 years as a colonoscopy up to age 76. Breast cancer screening with a mammogram is recommended biennially  for women age 54-69. Screening for cervical cancer with a pap smear and pelvic exam is recommended for women after age 72 years every 2 years up to age 79 or when the provider and patient decide to stop. If there is a history of cervical abnormalities or other increased risk for cancer then the test is recommended yearly. Hepatitis C screening is also recommended for anyone born between 80 through [address]. A shingles vaccine is also recommended once in a lifetime after age 61. Your Medicare Wellness Exam is recommended annually. Here is a list of your current Health Maintenance items with a due date: 
Health Maintenance Due Topic Date Due  Shingles Vaccine  01/16/1995  Bone Density Screening  03/16/2000  Flu Vaccine  08/01/2017  Glaucoma Screening   08/20/2017 [address] [place] [address] Annual Well Visit  02/17/2018 Please provide this summary of care documentation to your next provider. Your primary care clinician is listed as [address]. If you have any questions after today's visit, please call [phone].

## 2018-02-19 NOTE — PATIENT INSTRUCTIONS
A Healthy Lifestyle: Care Instructions  Your Care Instructions    A healthy lifestyle can help you feel good, stay at a healthy weight, and have plenty of energy for both work and play. A healthy lifestyle is something you can share with your whole family. A healthy lifestyle also can lower your risk for serious health problems, such as high blood pressure, heart disease, and diabetes. You can follow a few steps listed below to improve your health and the health of your family. Follow-up care is a key part of your treatment and safety. Be sure to make and go to all appointments, and call your doctor if you are having problems. It's also a good idea to know your test results and keep a list of the medicines you take. How can you care for yourself at home? · Do not eat too much sugar, fat, or fast foods. You can still have dessert and treats now and then. The goal is moderation. · Start small to improve your eating habits. Pay attention to portion sizes, drink less juice and soda pop, and eat more fruits and vegetables. ¨ Eat a healthy amount of food. A 3-ounce serving of meat, for example, is about the size of a deck of cards. Fill the rest of your plate with vegetables and whole grains. ¨ Limit the amount of soda and sports drinks you have every day. Drink more water when you are thirsty. ¨ Eat at least 5 servings of fruits and vegetables every day. It may seem like a lot, but it is not hard to reach this goal. A serving or helping is 1 piece of fruit, 1 cup of vegetables, or 2 cups of leafy, raw vegetables. Have an apple or some carrot sticks as an afternoon snack instead of a candy bar. Try to have fruits and/or vegetables at every meal.  · Make exercise part of your daily routine. You may want to start with simple activities, such as walking, bicycling, or slow swimming. Try to be active 30 to 60 minutes every day. You do not need to do all 30 to 60 minutes all at once.  For example, you can exercise 3 times a day for 10 or 20 minutes. Moderate exercise is safe for most people, but it is always a good idea to talk to your doctor before starting an exercise program.  · Keep moving. [place] the lawn, work in the garden, or medineering. Take the stairs instead of the elevator at work. · If you smoke, quit. People who smoke have an increased risk for heart attack, stroke, cancer, and other lung illnesses. Quitting is hard, but there are ways to boost your chance of quitting tobacco for good. ¨ Use nicotine gum, patches, or lozenges. ¨ Ask your doctor about stop-smoking programs and medicines. ¨ Keep trying. In addition to reducing your risk of diseases in the future, you will notice some benefits soon after you stop using tobacco. If you have shortness of breath or asthma symptoms, they will likely get better within a few weeks after you quit. · Limit how much alcohol you drink. Moderate amounts of alcohol (up to 2 drinks a day for men, 1 drink a day for women) are okay. But drinking too much can lead to liver problems, high blood pressure, and other health problems. Family health  If you have a family, there are many things you can do together to improve your health. · Eat meals together as a family as often as possible. · Eat healthy foods. This includes fruits, vegetables, lean meats and dairy, and whole grains. · Include your family in your fitness plan. Most people think of activities such as jogging or tennis as the way to fitness, but there are many ways you and your family can be more active. Anything that makes you breathe hard and gets your heart pumping is exercise. Here are some tips:  ¨ Walk to do errands or to take your child to school or the bus. ¨ Go for a family bike ride after dinner instead of watching TV. Where can you learn more? Go to http://ebony-fatoumata.info/. Enter G592 in the search box to learn more about \"A Healthy Lifestyle: Care Instructions. \"  Current as of: May 12, 2017  Content Version: 11.4  © 1240-0647 Healthwise, [employer]. Care instructions adapted under license by [employer] (which disclaims liability or warranty for this information). If you have questions about a medical condition or this instruction, always ask your healthcare professional. [address] any warranty or liability for your use of this information. Medicare Wellness Visit, Female    The best way to live healthy is to have a healthy lifestyle by eating a well-balanced diet, exercising regularly, limiting alcohol and stopping smoking. Regular physical exams and screening tests are another way to keep healthy. Preventive exams provided by your health care provider can find health problems before they become diseases or illnesses. Preventive services including immunizations, screening tests, monitoring and exams can help you take care of your own health. All people over age 72 should have a pneumovax  and and a prevnar shot to prevent pneumonia. These are once in a lifetime unless you and your provider decide differently. All people over 65 should have a yearly flu shot and a tetanus vaccine every 10 years. A bone mass density to screen for osteoporosis or thinning of the bones should be done every 2 years after 65. Screening for diabetes mellitus with a blood sugar test should be done every year. Glaucoma is a disease of the eye due to increased ocular pressure that can lead to blindness and it should be done every year by an eye professional.    Cardiovascular screening tests that check for elevated lipids (fatty part of blood) which can lead to heart disease and strokes should be done every 5 years. Colorectal screening that evaluates for blood or polyps in your colon should be done yearly as a stool test or every five years as a flexible sigmoidoscope or every 10 years as a colonoscopy up to age 76.     Breast cancer screening with a mammogram is recommended biennially  for women age 54-69. Screening for cervical cancer with a pap smear and pelvic exam is recommended for women after age 72 years every 2 years up to age 79 or when the provider and patient decide to stop. If there is a history of cervical abnormalities or other increased risk for cancer then the test is recommended yearly. Hepatitis C screening is also recommended for anyone born between [birth year] through [address]. A shingles vaccine is also recommended once in a lifetime after age 61. Your Medicare Wellness Exam is recommended annually.     Here is a list of your current Health Maintenance items with a due date:  Health Maintenance Due   Topic Date Due    Shingles Vaccine  01/16/1995    Bone Density Screening  03/16/2000    Flu Vaccine  08/01/2017    Glaucoma Screening   08/20/2017    Annual Well Visit  02/17/2018

## 2018-02-19 NOTE — PROGRESS NOTES
Reviewed record in preparation for visit and have obtained necessary documentation. Patient did not bring medications to visit for review. Information provided on Advanced Directive, Living Will. Body mass index is 31.83 kg/(m^2). Health Maintenance Due   Topic Date Due    ZOSTER VACCINE AGE 60>  01/16/1995    OSTEOPOROSIS SCREENING (DEXA)  03/16/2000    Influenza Age 5 to Adult  08/01/2017    GLAUCOMA SCREENING Q2Y  08/20/2017    MEDICARE YEARLY EXAM  02/17/2018     1. Have you been to the ER, urgent care clinic since your last visit? Hospitalized since your last visit? no    2. Have you seen or consulted any other health care providers outside of the [address] [place] [address] since your last visit? Include any pap smears or colon screening.  no

## 2018-02-19 NOTE — PROGRESS NOTES
[facility]  [address], [address]  [phone]             Date of visit: 2/20/2018       This is an Subsequent Medicare Annual Wellness Visit (AWV), (Performed more than 12 months after effective date of Medicare Part B enrollment and 12 months after last preventive visit, Once in a lifetime)    I have reviewed the patient's medical history in detail and updated the computerized patient record. Abbe Horne is a 80 y.o. female   History obtained from: spouse and the patient. Concerns today   Patient understands that medical problems addressed today may incur additional cost as this is a preventive visit  She has a spot of eczema on her left knee that she needs some cream for. History     Patient Active Problem List   Diagnosis Code    GERD (gastroesophageal reflux disease) K21.9    Hypertension I10    Arthritis M19.90    GI bleed K92.2    Symptomatic anemia D64.9    Cerebral thrombosis with cerebral infarction ([address] I63.30    Right hemiparesis ([address] G81.91    On statin therapy Z79.899    Antiplatelet or antithrombotic long-term use Z79.02    Late onset Alzheimer's disease without behavioral disturbance G30.1, F02.80     Past Medical History:   Diagnosis Date    Anemia     Arthritis     Dementia     GERD (gastroesophageal reflux disease)     HLD (hyperlipidemia)     Hypertension     Stroke ([address]     Thyroid disease     thinks had partial resection? Not on meds and normal TSH 10/2016      Past Surgical History:   Procedure Laterality Date    HX HYSTERECTOMY      Can't remember why but is sure she never had cancer. No Known Allergies  Current Outpatient Prescriptions   Medication Sig Dispense Refill    triamcinolone acetonide (KENALOG) 0.025 % topical cream Apply  to affected area two (2) times a day. use thin layer 15 g 3    atorvastatin (LIPITOR) 40 mg tablet Take 1 Tab by mouth daily.  90 Tab 1    pantoprazole (PROTONIX) 40 mg tablet Take 1 Tab by mouth daily. 90 Tab 1    carvedilol (COREG) 6.25 mg tablet Take 1 Tab by mouth two (2) times daily (with meals). 180 Tab 1    amLODIPine (NORVASC) 10 mg tablet Take 1 Tab by mouth nightly. 90 Tab 2    ascorbic acid (VITAMIN C) 250 mg tablet Take 1 Tab by mouth two (2) times daily (with meals). 60 Tab 3    aspirin 81 mg chewable tablet Take 1 Tab by mouth daily. 100 Tab 3    ferrous sulfate 325 mg (65 mg iron) tablet Take 1 Tab by mouth two (2) times daily (with meals). 60 Tab 3    therapeutic multivitamin (THERAGRAN) tablet Take 1 Tab by mouth daily.  acetaminophen (TYLENOL ARTHRITIS PAIN) 650 mg CR tablet Take 650 mg by mouth every six (6) hours as needed for Pain. Family History   Problem Relation Age of Onset    Cancer Mother      colon    Alzheimer Mother     Cancer Father      bladder    Cancer Sister      not sure what kind    Cancer Brother      not sure what kind    Heart Disease Maternal Grandmother     Stroke Other      at young age     Social History   Substance Use Topics    Smoking status: Never Smoker    Smokeless tobacco: Never Used    Alcohol use No       Specialists/Care Team   Kvng Lin has established care with the following healthcare providers:  Patient Care Team:  Yas Newton MD as PCP - General (Family Practice)  Ross Casillas MD (Neurology)    Health Risk Assessment     Demographics   female  80 y.o. General Health Questions   -During the past 4 weeks:   -how would you rate your health in general? Fair   -how often have you been bothered by feeling dizzy when standing up? occasionally   -how much have you been bothered by bodily pain? mildly   -Have you noticed any hearing difficulties? no   -has your physical and emotional health limited your social activities with family or friends?  No    Emotional Health Questions   -Do you have a history of depression, anxiety, or emotional problems? no  -Over the past 2 weeks, have you felt down, depressed or hopeless? no  -Over the past 2 weeks, have you felt little interest or pleasure in doing things? no    Health Habits   Please describe your diet habits: Appetite is fair, eats breakfast after she wakes up at noon. Light dinner, sandwich and vegetables  Do you get 5 servings of fruits or vegetables daily? no  Do you exercise regularly? no  Alcohol Use: None    Activities of Daily Living and Functional Status   -Do you need help with eating, walking, dressing, bathing, toileting, the phone, transportation, shopping, preparing meals, housework, laundry, medications or managing money? Positive answers in red  -In the past four weeks, was someone available to help you if you needed and wanted help with anything? yes  -Are you confident are you that you can control and manage most of your health problems? yes  -Have you been given information to help you keep track of your medications? yes  -How often do you have trouble taking your medications as prescribed? never    Fall Risk and Home Safety   Have you fallen 2 or more times in the past year? no  Does your home have rugs in the hallway, lack grab bars in the bathroom, lack handrails on the stairs or have poor lighting? no  Do you have smoke detectors and check them regularly? yes  Do you have difficulties driving a car? Doesn't drive  Do you always fasten your seat belt when you are in a car? No    Physical Examination     Vitals:    02/19/18 1345   BP: 122/77   Pulse: 72   Resp: 20   Temp: 96.9 °F (36.1 °C)   TempSrc: Oral   SpO2: 97%   Weight: 174 lb (78.9 kg)   Height: 5' 2\" (1.575 m)     Body mass index is 31.83 kg/(m^2). No exam data present  Was the patient's timed Up & Go test unsteady or longer than 30 seconds?  yes    Evaluation of Cognitive Function   Mood/affect: \"I get along\"/appropriate  Orientation: Person, Place and Situation (Doesn't know year of month)  Appearance: age appropriate and within normal Limits  Family member/caregiver input:  would like her to exercise more and use her muscles. He is nervous she is going to get too weak. She sleeps a lot as well. Additional exam if indicated for problems addressed today:  5x4cm spot of eczema on her left knee    Advice/Referrals/Counseling (as indicated)   Education and counseling provided for any problems identified above:     Preventive Services     (Preventive care checklist to be included in patient instructions)  Discussed today Done Previously Not Needed     X  Pneumococcal vaccines   X   Flu vaccine     X Hepatitis B vaccine (if at risk)   X   Shingles vaccine    X  TDAP vaccine   X - declines   Mammogram     X Pap smear   X - declines   Colorectal cancer screening     X Low-dose CT for lung cancer screening   X - declines   Bone density test   X   Glaucoma screening    X  Cholesterol test    X  Diabetes screening test      X Diabetes self-management class     X Nutritionist referral for diabetes or renal disease     Discussion of Advance Directive   Discussed with Kvng Sharp her ability to prepare and advance directive in the case that an injury or illness causes her to be unable to make health care decisions. Advanced Directive on file. Assessment/Plan   V70.0    ICD-10-CM ICD-9-CM    1. Flexural eczema L20.82 691.8 triamcinolone acetonide (KENALOG) 0.025 % topical cream   2. Medicare annual wellness visit, subsequent Z00.00 V70.0    3. Screening for alcoholism Z13.89 V79.1 DC ANNUAL ALCOHOL SCREEN 15 MIN   4. Screening for depression Z13.89 V79.0 DEPRESSION SCREEN ANNUAL   5. Mixed hyperlipidemia E78.2 272.2 LIPID PANEL   6.  Essential hypertension R12 190.2 METABOLIC PANEL, COMPREHENSIVE       Orders Placed This Encounter    Depression Screen Annual    LIPID PANEL    METABOLIC PANEL, COMPREHENSIVE    Annual  Alcohol Screen 15 min ()    triamcinolone acetonide (KENALOG) 0.025 % topical cream     Patient advised to always wear a seatbelt  Discussed worsening dementia with pt and  - feel this is the reason she is not wanting to do a lot of exercise or stay on a normal sleep schedule - she does not seem adverse to these things but tends to forget that she had talked about them after about 30-45 minutes. Discussed medications for dementia. They are not interested at this time. Have tried home PT for debility in the past but her  is not sure it really helped because she did not continue the exercises afterwards and he does not think she will in the future either. Will continue to monitor and assist as able. Kenalog cream for eczema. Follow-up Disposition:  Return in about 6 months (around 8/19/2018) for f/u chronic conditions.     Keri Ely MD

## 2018-02-20 ENCOUNTER — TELEPHONE (OUTPATIENT)
Dept: FAMILY MEDICINE CLINIC | Age: 83
End: 2018-02-20

## 2018-02-20 DIAGNOSIS — L20.82 FLEXURAL ECZEMA: ICD-10-CM

## 2018-02-20 DIAGNOSIS — D64.9 ANEMIA, UNSPECIFIED TYPE: ICD-10-CM

## 2018-02-20 DIAGNOSIS — R63.0 ANOREXIA: ICD-10-CM

## 2018-02-20 DIAGNOSIS — R73.9 HYPERGLYCEMIA: ICD-10-CM

## 2018-02-20 DIAGNOSIS — R79.89 ELEVATED LFTS: Primary | ICD-10-CM

## 2018-02-20 LAB
ALBUMIN SERPL-MCNC: 4.4 G/DL (ref 3.5–4.7)
ALBUMIN/GLOB SERPL: 1.5 {RATIO} (ref 1.2–2.2)
ALP SERPL-CCNC: 216 IU/L (ref 39–117)
ALT SERPL-CCNC: 35 IU/L (ref 0–32)
AST SERPL-CCNC: 42 IU/L (ref 0–40)
BILIRUB SERPL-MCNC: 1.2 MG/DL (ref 0–1.2)
BUN SERPL-MCNC: 17 MG/DL (ref 8–27)
BUN/CREAT SERPL: 13 (ref 12–28)
CALCIUM SERPL-MCNC: 10 MG/DL (ref 8.7–10.3)
CHLORIDE SERPL-SCNC: 100 MMOL/L (ref 96–106)
CHOLEST SERPL-MCNC: 133 MG/DL (ref 100–199)
CO2 SERPL-SCNC: 20 MMOL/L (ref 18–29)
CREAT SERPL-MCNC: 1.28 MG/DL (ref 0.57–1)
GFR SERPLBLD CREATININE-BSD FMLA CKD-EPI: 39 ML/MIN/1.73
GFR SERPLBLD CREATININE-BSD FMLA CKD-EPI: 45 ML/MIN/1.73
GLOBULIN SER CALC-MCNC: 2.9 G/DL (ref 1.5–4.5)
GLUCOSE SERPL-MCNC: 187 MG/DL (ref 65–99)
HDLC SERPL-MCNC: 42 MG/DL
LDLC SERPL CALC-MCNC: 43 MG/DL (ref 0–99)
POTASSIUM SERPL-SCNC: 4.4 MMOL/L (ref 3.5–5.2)
PROT SERPL-MCNC: 7.3 G/DL (ref 6–8.5)
SODIUM SERPL-SCNC: 145 MMOL/L (ref 134–144)
TRIGL SERPL-MCNC: 242 MG/DL (ref 0–149)
VLDLC SERPL CALC-MCNC: 48 MG/DL (ref 5–40)

## 2018-02-20 RX ORDER — TRIAMCINOLONE ACETONIDE 0.25 MG/G
CREAM TOPICAL
Qty: 15 G | Refills: 3 | Status: SHIPPED | OUTPATIENT
Start: 2018-02-20 | End: 2018-08-27 | Stop reason: ALTCHOICE

## 2018-02-20 NOTE — TELEPHONE ENCOUNTER
Called to inform pt'  of pt's recent results. Kidney function slightly worse than last time. It has done this in the past. Given her age and co-morbidities, feel it is reasonable to monitor this closely and refer only if kidney function worsening. Her LFT's remain elevated. Chart review shows that they never had the abdominal US done. Pt's  does not remember anything about this. Will re-order and also get hepatitis and iron panel. Random blood glucose was 187, not diagnostic for diabetes, but concerning. Instructed them to cut back on carbohydrates and will need to check A1c. All questions answered and pt's  feels comfortable with the plan of care. He will bring her to the lab this week to have labs drawn and will plan to get the [place] at [facility]. Will call with results.

## 2018-02-22 LAB
EST. AVERAGE GLUCOSE BLD GHB EST-MCNC: 105 MG/DL
HAV IGM SERPL QL IA: NEGATIVE
HBA1C MFR BLD: 5.3 % (ref 4.8–5.6)
HBV CORE IGM SERPL QL IA: NEGATIVE
HBV SURFACE AG SERPL QL IA: NEGATIVE
HCV AB S/CO SERPL IA: <0.1 S/CO RATIO (ref 0–0.9)
IRON SATN MFR SERPL: 33 % (ref 15–55)
IRON SERPL-MCNC: 80 UG/DL (ref 27–139)
TIBC SERPL-MCNC: 241 UG/DL (ref 250–450)
UIBC SERPL-MCNC: 161 UG/DL (ref 118–369)

## 2018-02-23 ENCOUNTER — TELEPHONE (OUTPATIENT)
Dept: FAMILY MEDICINE CLINIC | Age: 83
End: 2018-02-23

## 2018-02-23 NOTE — TELEPHONE ENCOUNTER
Called to inform pt's  of recent lab results. Hepatitis panel negative and iron profile not revealing. A1c in the normal range. Will continue with plan to get the abdominal US and will call them with those results and a plan once available. All questions answered and pt's  feels comfortable with the plan of care.

## 2018-03-06 ENCOUNTER — TELEPHONE (OUTPATIENT)
Dept: FAMILY MEDICINE CLINIC | Age: 83
End: 2018-03-06

## 2018-03-06 NOTE — TELEPHONE ENCOUNTER
Called to inform pt's  of recent results. US Abd shows fatty infiltration of the liver with no other abnormalities. Gallbladder surgically absent (pt had never mentioned this despite being asked several times). Will continue to monitor liver enzymes but no further management needed at this point. She is already taking atorvastatin 40mg daily. All questions answered and pt's  feels comfortable with the plan of care.

## 2018-04-09 DIAGNOSIS — K44.9 HIATAL HERNIA: ICD-10-CM

## 2018-04-09 DIAGNOSIS — K92.1 GASTROINTESTINAL HEMORRHAGE WITH MELENA: ICD-10-CM

## 2018-04-09 DIAGNOSIS — I10 ESSENTIAL HYPERTENSION: ICD-10-CM

## 2018-04-09 RX ORDER — CARVEDILOL 6.25 MG/1
6.25 TABLET ORAL 2 TIMES DAILY WITH MEALS
Qty: 180 TAB | Refills: 1 | Status: CANCELLED | OUTPATIENT
Start: 2018-04-09

## 2018-04-09 RX ORDER — PANTOPRAZOLE SODIUM 40 MG/1
40 TABLET, DELAYED RELEASE ORAL DAILY
Qty: 90 TAB | Refills: 1 | Status: CANCELLED | OUTPATIENT
Start: 2018-04-09

## 2018-04-10 RX ORDER — PANTOPRAZOLE SODIUM 40 MG/1
40 TABLET, DELAYED RELEASE ORAL DAILY
Qty: 90 TAB | Refills: 1 | Status: SHIPPED | OUTPATIENT
Start: 2018-04-10 | End: 2018-10-24 | Stop reason: SDUPTHER

## 2018-04-10 RX ORDER — CARVEDILOL 6.25 MG/1
6.25 TABLET ORAL 2 TIMES DAILY WITH MEALS
Qty: 180 TAB | Refills: 1 | Status: SHIPPED | OUTPATIENT
Start: 2018-04-10 | End: 2018-10-16 | Stop reason: SDUPTHER

## 2018-04-24 DIAGNOSIS — K92.1 GASTROINTESTINAL HEMORRHAGE WITH MELENA: ICD-10-CM

## 2018-04-24 DIAGNOSIS — K44.9 HIATAL HERNIA: ICD-10-CM

## 2018-04-24 RX ORDER — PANTOPRAZOLE SODIUM 40 MG/1
40 TABLET, DELAYED RELEASE ORAL DAILY
Qty: 90 TAB | Refills: 1 | OUTPATIENT
Start: 2018-04-24

## 2018-05-21 DIAGNOSIS — I10 ESSENTIAL HYPERTENSION: ICD-10-CM

## 2018-05-21 RX ORDER — AMLODIPINE BESYLATE 10 MG/1
10 TABLET ORAL
Qty: 90 TAB | Refills: 2 | Status: CANCELLED | OUTPATIENT
Start: 2018-05-21

## 2018-05-22 RX ORDER — AMLODIPINE BESYLATE 10 MG/1
10 TABLET ORAL
Qty: 90 TAB | Refills: 1 | Status: SHIPPED | OUTPATIENT
Start: 2018-05-22 | End: 2018-11-19 | Stop reason: SDUPTHER

## 2018-07-17 DIAGNOSIS — E78.2 MIXED HYPERLIPIDEMIA: ICD-10-CM

## 2018-07-17 RX ORDER — ATORVASTATIN CALCIUM 40 MG/1
TABLET, FILM COATED ORAL
Qty: 90 TAB | Refills: 0 | Status: SHIPPED | OUTPATIENT
Start: 2018-07-17 | End: 2018-08-20 | Stop reason: SDUPTHER

## 2018-08-20 DIAGNOSIS — E78.2 MIXED HYPERLIPIDEMIA: ICD-10-CM

## 2018-08-20 RX ORDER — ATORVASTATIN CALCIUM 40 MG/1
TABLET, FILM COATED ORAL
Qty: 90 TAB | Refills: 0 | Status: CANCELLED | OUTPATIENT
Start: 2018-08-20

## 2018-08-20 RX ORDER — ATORVASTATIN CALCIUM 40 MG/1
TABLET, FILM COATED ORAL
Qty: 90 TAB | Refills: 0 | Status: SHIPPED | OUTPATIENT
Start: 2018-08-20 | End: 2018-11-19 | Stop reason: SDUPTHER

## 2018-08-27 ENCOUNTER — OFFICE VISIT (OUTPATIENT)
Dept: FAMILY MEDICINE CLINIC | Age: 83
End: 2018-08-27

## 2018-08-27 VITALS
WEIGHT: 169 LBS | TEMPERATURE: 97 F | BODY MASS INDEX: 31.1 KG/M2 | HEIGHT: 62 IN | RESPIRATION RATE: 24 BRPM | OXYGEN SATURATION: 94 % | DIASTOLIC BLOOD PRESSURE: 80 MMHG | SYSTOLIC BLOOD PRESSURE: 135 MMHG | HEART RATE: 74 BPM

## 2018-08-27 DIAGNOSIS — L20.82 FLEXURAL ECZEMA: ICD-10-CM

## 2018-08-27 DIAGNOSIS — E78.2 MIXED HYPERLIPIDEMIA: ICD-10-CM

## 2018-08-27 DIAGNOSIS — I10 ESSENTIAL HYPERTENSION: Primary | ICD-10-CM

## 2018-08-27 DIAGNOSIS — N18.30 STAGE 3 CHRONIC KIDNEY DISEASE (HCC): ICD-10-CM

## 2018-08-27 DIAGNOSIS — G81.91 RIGHT HEMIPARESIS (HCC): ICD-10-CM

## 2018-08-27 RX ORDER — DESONIDE 0.5 MG/G
OINTMENT TOPICAL 2 TIMES DAILY
Qty: 60 G | Refills: 3 | Status: SHIPPED | OUTPATIENT
Start: 2018-08-27 | End: 2020-02-24

## 2018-08-27 NOTE — PROGRESS NOTES
1. Have you been to the ER, urgent care clinic since your last visit? Hospitalized since your last visit? no    2. Have you seen or consulted any other health care providers outside of the [address] [place] [address] since your last visit? Include any pap smears or colon screening. no  Reviewed record in preparation for visit and have obtained necessary documentation. Patient did not bring medications to visit for review. Information provided on Advanced Directive, Living Will. Body mass index is 30.91 kg/(m^2).    Health Maintenance Due   Topic Date Due    ZOSTER VACCINE AGE 60>  01/16/1995    Bone Densitometry (Dexa) Screening  03/16/2000    GLAUCOMA SCREENING Q2Y  08/20/2017    Influenza Age 5 to Adult  08/01/2018

## 2018-08-27 NOTE — PATIENT INSTRUCTIONS

## 2018-08-27 NOTE — MR AVS SNAPSHOT
[address] [address] 
[phone] Patient: Geovani Amato MRN: [id number] JOY:[birth year] Visit Information Date & Time Provider Department Dept. Phone Encounter #  
 8/27/2018  1:00 PM Hannah Damon  [facility] [phone] [id number] Follow-up Instructions Return in about 6 months (around 2/27/2019) for f/u chronic conditions. Upcoming Health Maintenance Date Due ZOSTER VACCINE AGE 60> 1/16/1995 Bone Densitometry (Dexa) Screening 3/16/2000 GLAUCOMA SCREENING Q2Y 8/20/2017 Influenza Age 5 to Adult 8/1/2018 MEDICARE YEARLY EXAM 2/20/2019 DTaP/Tdap/Td series (2 - Td) 2/21/2027 Allergies as of 8/27/2018  Review Complete On: 8/27/2018 By: Yvette Higginbotham LPN No Known Allergies Current Immunizations  Reviewed on 6/15/2015 Name Date Pneumococcal Conjugate (PCV-13)  Incomplete, 2/21/2017 Pneumococcal Vaccine (Unspecified Type) 2/3/2014 Tdap  Incomplete Not reviewed this visit You Were Diagnosed With   
  
 Codes Comments Essential hypertension    -  Primary ICD-10-CM: I10 
ICD-9-CM: 401.9 Mixed hyperlipidemia     ICD-10-CM: E78.2 ICD-9-CM: 272.2 Flexural eczema     ICD-10-CM: L20.82 ICD-9-CM: 691.8 Vitals BP Pulse Temp Resp Height(growth percentile) Weight(growth percentile) 135/80 (BP 1 Location: Left arm, BP Patient Position: Sitting) 74 97 °F (36.1 °C) (Oral) 24 5' 2\" (1.575 m) 169 lb (76.7 kg) SpO2 BMI OB Status Smoking Status 94% 30.91 kg/m2 Hysterectomy Never Smoker Vitals History BMI and BSA Data Body Mass Index Body Surface Area 30.91 kg/m 2 1.83 m 2 Preferred Pharmacy Pharmacy Name Phone [facility], [address] [place] [address] [phone] Your Updated Medication List  
  
   
 This list is accurate as of 8/27/18  1:31 PM.  Always use your most recent med list. amLODIPine 10 mg tablet Commonly known as:  Rory Eagles Take 1 Tab by mouth nightly. ascorbic acid (vitamin C) 250 mg tablet Commonly known as:  VITAMIN C Take 1 Tab by mouth two (2) times daily (with meals). aspirin 81 mg chewable tablet Take 1 Tab by mouth daily. atorvastatin 40 mg tablet Commonly known as:  LIPITOR  
TAKE 1 TABLET BY MOUTH ONCE DAILY  
  
 carvedilol 6.25 mg tablet Commonly known as:  Gillermina Begun Take 1 Tab by mouth two (2) times daily (with meals). desonide 0.05 % topical ointment Commonly known as:  Bethanyangeles Mendosa Apply  to affected area two (2) times a day. ferrous sulfate 325 mg (65 mg iron) tablet Take 1 Tab by mouth two (2) times daily (with meals). pantoprazole 40 mg tablet Commonly known as:  PROTONIX Take 1 Tab by mouth daily. therapeutic multivitamin tablet Commonly known as:  [facility] Take 1 Tab by mouth daily. TYLENOL ARTHRITIS PAIN 650 mg Catherene Reach Generic drug:  acetaminophen Take 650 mg by mouth every six (6) hours as needed for Pain. Prescriptions Sent to Pharmacy Refills  
 desonide (DESOWEN) 0.05 % topical ointment 3 Sig: Apply  to affected area two (2) times a day. Class: Normal  
 Pharmacy: [facility] Barbara Paredes [facility] [address] [place] [address] Ph #: [phone] Route: Topical  
  
We Performed the Following LIPID PANEL [02653 CPT(R)] METABOLIC PANEL, BASIC [45399 CPT(R)] Follow-up Instructions Return in about 6 months (around 2/27/2019) for f/u chronic conditions. Patient Instructions A Healthy Lifestyle: Care Instructions Your Care Instructions A healthy lifestyle can help you feel good, stay at a healthy weight, and have plenty of energy for both work and play. A healthy lifestyle is something you can share with your whole family. A healthy lifestyle also can lower your risk for serious health problems, such as high blood pressure, heart disease, and diabetes. You can follow a few steps listed below to improve your health and the health of your family. Follow-up care is a key part of your treatment and safety. Be sure to make and go to all appointments, and call your doctor if you are having problems. It's also a good idea to know your test results and keep a list of the medicines you take. How can you care for yourself at home? · Do not eat too much sugar, fat, or fast foods. You can still have dessert and treats now and then. The goal is moderation. · Start small to improve your eating habits. Pay attention to portion sizes, drink less juice and soda pop, and eat more fruits and vegetables. ¨ Eat a healthy amount of food. A 3-ounce serving of meat, for example, is about the size of a deck of cards. Fill the rest of your plate with vegetables and whole grains. ¨ Limit the amount of soda and sports drinks you have every day. Drink more water when you are thirsty. ¨ Eat at least 5 servings of fruits and vegetables every day. It may seem like a lot, but it is not hard to reach this goal. A serving or helping is 1 piece of fruit, 1 cup of vegetables, or 2 cups of leafy, raw vegetables. Have an apple or some carrot sticks as an afternoon snack instead of a candy bar. Try to have fruits and/or vegetables at every meal. 
· Make exercise part of your daily routine. You may want to start with simple activities, such as walking, bicycling, or slow swimming. Try to be active 30 to 60 minutes every day. You do not need to do all 30 to 60 minutes all at once. For example, you can exercise 3 times a day for 10 or 20 minutes. Moderate exercise is safe for most people, but it is always a good idea to talk to your doctor before starting an exercise program. 
· Keep moving. Tad Lesly the lawn, work in the garden, or [employer].  Take the stairs instead of the elevator at work. · If you smoke, quit. People who smoke have an increased risk for heart attack, stroke, cancer, and other lung illnesses. Quitting is hard, but there are ways to boost your chance of quitting tobacco for good. ¨ Use nicotine gum, patches, or lozenges. ¨ Ask your doctor about stop-smoking programs and medicines. ¨ Keep trying. In addition to reducing your risk of diseases in the future, you will notice some benefits soon after you stop using tobacco. If you have shortness of breath or asthma symptoms, they will likely get better within a few weeks after you quit. · Limit how much alcohol you drink. Moderate amounts of alcohol (up to 2 drinks a day for men, 1 drink a day for women) are okay. But drinking too much can lead to liver problems, high blood pressure, and other health problems. Family health If you have a family, there are many things you can do together to improve your health. · Eat meals together as a family as often as possible. · Eat healthy foods. This includes fruits, vegetables, lean meats and dairy, and whole grains. · Include your family in your fitness plan. Most people think of activities such as jogging or tennis as the way to fitness, but there are many ways you and your family can be more active. Anything that makes you breathe hard and gets your heart pumping is exercise. Here are some tips: 
¨ Walk to do errands or to take your child to school or the bus. ¨ Go for a family bike ride after dinner instead of watching TV. Where can you learn more? Go to http://ebony-fatoumata.info/. Enter W050 in the search box to learn more about \"A Healthy Lifestyle: Care Instructions. \" Current as of: December 7, 2017 Content Version: 11.7 © 8055-4167 [employer].  Care instructions adapted under license by [employer] (which disclaims liability or warranty for this information). If you have questions about a medical condition or this instruction, always ask your healthcare professional. [address] any warranty or liability for your use of this information. Introducing [facility]! Dear Cheo Browne: Thank you for requesting a [employer] account. Our records indicate that you have previously registered for a [employer] account but its currently inactive. Please call our [employer] support line at [phone]. Additional Information If you have questions, please visit the Frequently Asked Questions section of the [employer] website at [email or web address] Remember, [employer] is NOT to be used for urgent needs. For medical emergencies, dial 911. Now available from your iPhone and Android! Please provide this summary of care documentation to your next provider. Your primary care clinician is listed as [address] If you have any questions after today's visit, please call [phone].

## 2018-08-27 NOTE — PROGRESS NOTES
CC: f/u HTN, HLD and CKD    HPI: Pt is a 80 y.o. female who presents for f/u HTN, HLD and CKD. Pt is a poor historian 2/2 dementia and is present with her  today who provides most of the history. They do not think the cream for her eczema on her legs is working. Otherwise she is feeling well. She has a h/o stroke with residual mild R hemiparesis. She has worked with PT in the past to help with this and her  tries to get her to do the home exercises but she is not very interested in doing these. She has been taking a statin to lower ASCVD risk and has not had any side effects from it. HTN:  Checking BPs at home?: YES, sometimes  Logs today?: NO  Range of BPs?: Not sure but  states they are \"good\"  Adding salt to foods?: NO  Headaches?: NO  Blurry vision?: NO - she has poor vision at baseline but has not wanted to have her cataracts removed. Lower extremity edema?: YES, mild  Smoking?: NO      Past Medical History:   Diagnosis Date    Anemia     Arthritis     Dementia     GERD (gastroesophageal reflux disease)     HLD (hyperlipidemia)     Hypertension     Stroke ([facility] [address]     Thyroid disease     thinks had partial resection?  Not on meds and normal TSH 10/2016       Family History   Problem Relation Age of Onset    Cancer Mother      [facility] Alzheimer Mother     Cancer Father      bladder    Cancer Sister      not sure what kind    Cancer Brother      not sure what kind    Heart Disease Maternal Grandmother     Stroke Other      at young age       Social History   Substance Use Topics    Smoking status: Never Smoker    Smokeless tobacco: Never Used    Alcohol use No       ROS:  Positive only when bolded  Constitutional: HA, changes in weight  Eyes: Changes in vision  Respiratory: SOB, wheezing, cough  Cardiovascular: CP, palpitations  Hematologic/lymphatic: MAXIMUS    PE:  Visit Vitals    /80 (BP 1 Location: Left arm, BP Patient Position: Sitting)    Pulse 74    Temp 97 °F (36.1 °C) (Oral)    Resp 24    Ht 5' 2\" (1.575 m)    Wt 169 lb (76.7 kg)    SpO2 94%    BMI 30.91 kg/m2     Gen: Pt sitting in chair, in NAD  Head: Normocephalic, atraumatic  Eyes: Sclera anicteric, EOM grossly intact, PERRL  Throat: MMM, normal lips, tongue and gums  Neck: Supple, no LAD, no thyromegaly or carotid bruits  CVS: Normal S1, S2, no m/r/g  Resp: CTAB, no wheezes or rales  Extrem: Atraumatic, no cyanosis. Trace edema to ankles b/l  Pulses: 2+   Skin: Warm, dry. 5x5cm erythematous plaque on L anterior knee  Neuro: Alert, hard of hearing. Answers most questions appropriately      A/P: Pt is a 80 y.o. female who presents for f/u HTN, HLD and CKD  - BMP  - Lipid panel  - Discussed with pt and  the risks and benefits of continuing statin therapy. Given that she has a h/o stroke and has not had any negative side effects from the statin they would like to continue and I am in agreement  - Rx for Desowen cream sent to pharmacy for eczema  - RTC in 6 months for [address] and labs        Discussed diagnoses in detail with patient. Medication risks/benefits/side effects discussed with patient. All of the patient's questions were addressed. The patient understands and agrees with our plan of care. The patient knows to call back if they are unsure of or forget any changes we discussed today or if the symptoms change. The patient received an After-Visit Summary which contains VS, orders, medication list and allergy list. This can be used as a \"mini-medical record\" should they have to seek medical care while out of town. Current Outpatient Prescriptions on File Prior to Visit   Medication Sig Dispense Refill    atorvastatin (LIPITOR) 40 mg tablet TAKE 1 TABLET BY MOUTH ONCE DAILY 90 Tab 0    amLODIPine (NORVASC) 10 mg tablet Take 1 Tab by mouth nightly. 90 Tab 1    pantoprazole (PROTONIX) 40 mg tablet Take 1 Tab by mouth daily.  90 Tab 1    carvedilol (COREG) 6.25 mg tablet Take 1 Tab by mouth two (2) times daily (with meals). 180 Tab 1    triamcinolone acetonide (KENALOG) 0.025 % topical cream Apply to left knee two times a day. Use thin layer. 15 g 3    ascorbic acid (VITAMIN C) 250 mg tablet Take 1 Tab by mouth two (2) times daily (with meals). 60 Tab 3    aspirin 81 mg chewable tablet Take 1 Tab by mouth daily. 100 Tab 3    therapeutic multivitamin (THERAGRAN) tablet Take 1 Tab by mouth daily.  acetaminophen (TYLENOL ARTHRITIS PAIN) 650 mg CR tablet Take 650 mg by mouth every six (6) hours as needed for Pain.  ferrous sulfate 325 mg (65 mg iron) tablet Take 1 Tab by mouth two (2) times daily (with meals). 60 Tab 3     No current facility-administered medications on file prior to visit.

## 2018-08-28 PROBLEM — N18.30 STAGE 3 CHRONIC KIDNEY DISEASE (HCC): Status: ACTIVE | Noted: 2018-08-28

## 2018-08-28 LAB
BUN SERPL-MCNC: 19 MG/DL (ref 8–27)
BUN/CREAT SERPL: 15 (ref 12–28)
CALCIUM SERPL-MCNC: 9.7 MG/DL (ref 8.7–10.3)
CHLORIDE SERPL-SCNC: 101 MMOL/L (ref 96–106)
CHOLEST SERPL-MCNC: 111 MG/DL (ref 100–199)
CO2 SERPL-SCNC: 21 MMOL/L (ref 20–29)
CREAT SERPL-MCNC: 1.23 MG/DL (ref 0.57–1)
GLUCOSE SERPL-MCNC: 129 MG/DL (ref 65–99)
HDLC SERPL-MCNC: 44 MG/DL
LDLC SERPL CALC-MCNC: 32 MG/DL (ref 0–99)
POTASSIUM SERPL-SCNC: 4.5 MMOL/L (ref 3.5–5.2)
SODIUM SERPL-SCNC: 142 MMOL/L (ref 134–144)
TRIGL SERPL-MCNC: 176 MG/DL (ref 0–149)
VLDLC SERPL CALC-MCNC: 35 MG/DL (ref 5–40)

## 2018-10-16 DIAGNOSIS — I10 ESSENTIAL HYPERTENSION: ICD-10-CM

## 2018-10-16 RX ORDER — CARVEDILOL 6.25 MG/1
6.25 TABLET ORAL 2 TIMES DAILY WITH MEALS
Qty: 180 TAB | Refills: 1 | Status: SHIPPED | OUTPATIENT
Start: 2018-10-16 | End: 2019-01-14 | Stop reason: SDUPTHER

## 2018-10-16 NOTE — TELEPHONE ENCOUNTER
Pts , Robert Montejo, requesting a refill of \"Carvedilol 6.25mg\" to be sent to [facility] ([place]). Pt would like a 90day supply. Pharmacy contact [phone]     Last office visit 8/27/18. Message taken by Call Center.

## 2018-10-24 DIAGNOSIS — K92.1 GASTROINTESTINAL HEMORRHAGE WITH MELENA: ICD-10-CM

## 2018-10-24 DIAGNOSIS — K44.9 HIATAL HERNIA: ICD-10-CM

## 2018-10-24 RX ORDER — PANTOPRAZOLE SODIUM 40 MG/1
40 TABLET, DELAYED RELEASE ORAL DAILY
Qty: 90 TAB | Refills: 1 | Status: SHIPPED | OUTPATIENT
Start: 2018-10-24 | End: 2019-01-17 | Stop reason: SDUPTHER

## 2018-10-24 RX ORDER — PANTOPRAZOLE SODIUM 40 MG/1
40 TABLET, DELAYED RELEASE ORAL DAILY
Qty: 90 TAB | Refills: 1 | Status: CANCELLED | OUTPATIENT
Start: 2018-10-24

## 2018-11-19 DIAGNOSIS — I10 ESSENTIAL HYPERTENSION: ICD-10-CM

## 2018-11-19 DIAGNOSIS — E78.2 MIXED HYPERLIPIDEMIA: ICD-10-CM

## 2018-11-19 RX ORDER — AMLODIPINE BESYLATE 10 MG/1
10 TABLET ORAL
Qty: 90 TAB | Refills: 1 | Status: CANCELLED | OUTPATIENT
Start: 2018-11-19

## 2018-11-19 RX ORDER — ATORVASTATIN CALCIUM 40 MG/1
TABLET, FILM COATED ORAL
Qty: 90 TAB | Refills: 0 | Status: CANCELLED | OUTPATIENT
Start: 2018-11-19

## 2018-11-20 RX ORDER — AMLODIPINE BESYLATE 10 MG/1
10 TABLET ORAL
Qty: 90 TAB | Refills: 1 | Status: SHIPPED | OUTPATIENT
Start: 2018-11-20 | End: 2019-05-20 | Stop reason: SDUPTHER

## 2018-11-20 RX ORDER — ATORVASTATIN CALCIUM 40 MG/1
TABLET, FILM COATED ORAL
Qty: 90 TAB | Refills: 1 | Status: SHIPPED | OUTPATIENT
Start: 2018-11-20 | End: 2019-05-20 | Stop reason: SDUPTHER

## 2019-01-14 DIAGNOSIS — I10 ESSENTIAL HYPERTENSION: ICD-10-CM

## 2019-01-14 RX ORDER — CARVEDILOL 6.25 MG/1
TABLET ORAL
Qty: 180 TAB | Refills: 1 | Status: SHIPPED | OUTPATIENT
Start: 2019-01-14 | End: 2019-07-19 | Stop reason: SDUPTHER

## 2019-01-16 DIAGNOSIS — I10 ESSENTIAL HYPERTENSION: ICD-10-CM

## 2019-01-16 RX ORDER — AMLODIPINE BESYLATE 10 MG/1
10 TABLET ORAL
Qty: 90 TAB | Refills: 1 | OUTPATIENT
Start: 2019-01-16

## 2019-01-16 NOTE — TELEPHONE ENCOUNTER
This precription was put in at the same time but was not listed. On the bottle it states that it had a refill left.

## 2019-01-17 DIAGNOSIS — K44.9 HIATAL HERNIA: ICD-10-CM

## 2019-01-17 DIAGNOSIS — K92.1 GASTROINTESTINAL HEMORRHAGE WITH MELENA: ICD-10-CM

## 2019-01-17 RX ORDER — PANTOPRAZOLE SODIUM 40 MG/1
40 TABLET, DELAYED RELEASE ORAL DAILY
Qty: 90 TAB | Refills: 1 | Status: CANCELLED | OUTPATIENT
Start: 2019-01-17

## 2019-01-18 RX ORDER — PANTOPRAZOLE SODIUM 40 MG/1
40 TABLET, DELAYED RELEASE ORAL DAILY
Qty: 90 TAB | Refills: 1 | Status: SHIPPED | OUTPATIENT
Start: 2019-01-18 | End: 2019-07-19 | Stop reason: SDUPTHER

## 2019-02-21 ENCOUNTER — OFFICE VISIT (OUTPATIENT)
Dept: FAMILY MEDICINE CLINIC | Age: 84
End: 2019-02-21

## 2019-02-21 VITALS
RESPIRATION RATE: 16 BRPM | BODY MASS INDEX: 30.91 KG/M2 | DIASTOLIC BLOOD PRESSURE: 77 MMHG | OXYGEN SATURATION: 95 % | HEIGHT: 62 IN | TEMPERATURE: 96.9 F | SYSTOLIC BLOOD PRESSURE: 124 MMHG | WEIGHT: 168 LBS | HEART RATE: 69 BPM

## 2019-02-21 DIAGNOSIS — N18.30 STAGE 3 CHRONIC KIDNEY DISEASE (HCC): ICD-10-CM

## 2019-02-21 DIAGNOSIS — Z00.00 MEDICARE ANNUAL WELLNESS VISIT, SUBSEQUENT: Primary | ICD-10-CM

## 2019-02-21 DIAGNOSIS — I10 ESSENTIAL HYPERTENSION: ICD-10-CM

## 2019-02-21 NOTE — PATIENT INSTRUCTIONS
A Healthy Lifestyle: Care Instructions Your Care Instructions A healthy lifestyle can help you feel good, stay at a healthy weight, and have plenty of energy for both work and play. A healthy lifestyle is something you can share with your whole family. A healthy lifestyle also can lower your risk for serious health problems, such as high blood pressure, heart disease, and diabetes. You can follow a few steps listed below to improve your health and the health of your family. Follow-up care is a key part of your treatment and safety. Be sure to make and go to all appointments, and call your doctor if you are having problems. It's also a good idea to know your test results and keep a list of the medicines you take. How can you care for yourself at home? · Do not eat too much sugar, fat, or fast foods. You can still have dessert and treats now and then. The goal is moderation. · Start small to improve your eating habits. Pay attention to portion sizes, drink less juice and soda pop, and eat more fruits and vegetables. ? Eat a healthy amount of food. A 3-ounce serving of meat, for example, is about the size of a deck of cards. Fill the rest of your plate with vegetables and whole grains. ? Limit the amount of soda and sports drinks you have every day. Drink more water when you are thirsty. ? Eat at least 5 servings of fruits and vegetables every day. It may seem like a lot, but it is not hard to reach this goal. A serving or helping is 1 piece of fruit, 1 cup of vegetables, or 2 cups of leafy, raw vegetables. Have an apple or some carrot sticks as an afternoon snack instead of a candy bar. Try to have fruits and/or vegetables at every meal. 
· Make exercise part of your daily routine. You may want to start with simple activities, such as walking, bicycling, or slow swimming. Try to be active 30 to 60 minutes every day.  You do not need to do all 30 to 60 minutes all at once. For example, you can exercise 3 times a day for 10 or 20 minutes. Moderate exercise is safe for most people, but it is always a good idea to talk to your doctor before starting an exercise program. 
· Keep moving. Victorino Mahoney the lawn, work in the garden, or [employer]. Take the stairs instead of the elevator at work. · If you smoke, quit. People who smoke have an increased risk for heart attack, stroke, cancer, and other lung illnesses. Quitting is hard, but there are ways to boost your chance of quitting tobacco for good. ? Use nicotine gum, patches, or lozenges. ? Ask your doctor about stop-smoking programs and medicines. ? Keep trying. In addition to reducing your risk of diseases in the future, you will notice some benefits soon after you stop using tobacco. If you have shortness of breath or asthma symptoms, they will likely get better within a few weeks after you quit. · Limit how much alcohol you drink. Moderate amounts of alcohol (up to 2 drinks a day for men, 1 drink a day for women) are okay. But drinking too much can lead to liver problems, high blood pressure, and other health problems. Family health If you have a family, there are many things you can do together to improve your health. · Eat meals together as a family as often as possible. · Eat healthy foods. This includes fruits, vegetables, lean meats and dairy, and whole grains. · Include your family in your fitness plan. Most people think of activities such as jogging or tennis as the way to fitness, but there are many ways you and your family can be more active. Anything that makes you breathe hard and gets your heart pumping is exercise. Here are some tips: 
? Walk to do errands or to take your child to school or the bus. 
? Go for a family bike ride after dinner instead of watching TV. Where can you learn more? Go to http://ebony-fatoumata.info/. Enter N722 in the search box to learn more about \"A Healthy Lifestyle: Care Instructions. \" Current as of: September 11, 2018 Content Version: 11.9 © 3077-7490 [employer]. Care instructions adapted under license by [employer] (which disclaims liability or warranty for this information). If you have questions about a medical condition or this instruction, always ask your healthcare professional. [address] [address] any warranty or liability for your use of this information.

## 2019-02-21 NOTE — PROGRESS NOTES
1. Have you been to the ER, urgent care clinic since your last visit? Hospitalized since your last visit? no 
 
2. Have you seen or consulted any other health care providers outside of the [address] [place] [address] since your last visit? Include any pap smears or colon screening. no 
Reviewed record in preparation for visit and have obtained necessary documentation. Patient did not bring medications to visit for review. Information provided on Advanced Directive, Living Will. Body mass index is 30.73 kg/m². Health Maintenance Due Topic Date Due  Shingrix Vaccine Age 50> (1 of 2) 03/16/1985  Bone Densitometry (Dexa) Screening  03/16/2000  GLAUCOMA SCREENING Q2Y  08/20/2017  Influenza Age 5 to Adult  08/01/2018  MEDICARE YEARLY EXAM  02/20/2019

## 2019-02-21 NOTE — PROGRESS NOTES
[address] [facility] [address], [place] 
[phone] Date of visit: 2/22/2019 This is an Subsequent Medicare Annual Wellness Visit (AWV), (Performed more than 12 months after effective date of Medicare Part B enrollment and 12 months after last preventive visit, Once in a lifetime) I have reviewed the patient's medical history in detail and updated the computerized patient record. Tejal Jackson is a 80 y.o. female History obtained from: spouse and the patient. Concerns today Patient understands that medical problems addressed today may incur additional cost as this is a preventive visit 
-None History Patient Active Problem List  
Diagnosis Code  GERD (gastroesophageal reflux disease) K21.9  Hypertension I10  
 Arthritis M19.90  
 GI bleed K92.2  Symptomatic anemia D64.9  Right hemiparesis ([facility] G81.91  
 On statin therapy Z79.899  Antiplatelet or antithrombotic long-term use Z79.02  
 Late onset Alzheimer's disease without behavioral disturbance G30.1, F02.80  Stage 3 chronic kidney disease (HCC) N18.3 Past Medical History:  
Diagnosis Date  Anemia  Arthritis  Cerebral thrombosis with cerebral infarction ([address] 6/16/2015  Dementia  GERD (gastroesophageal reflux disease)  HLD (hyperlipidemia)  Hypertension  Stroke ([facility] [address]  Thyroid disease   
 thinks had partial resection? Not on meds and normal TSH 10/2016 Past Surgical History:  
Procedure Laterality Date  HX CHOLECYSTECTOMY  HX HYSTERECTOMY Can't remember why but is sure she never had cancer. No Known Allergies Current Outpatient Medications Medication Sig Dispense Refill  pantoprazole (PROTONIX) 40 mg tablet Take 1 Tab by mouth daily. 90 Tab 1  carvedilol (COREG) 6.25 mg tablet TAKE 1 TABLET BY MOUTH TWICE DAILY WITH MEALS 180 Tab 1  
  amLODIPine (NORVASC) 10 mg tablet Take 1 Tab by mouth nightly. 90 Tab 1  
 atorvastatin (LIPITOR) 40 mg tablet TAKE 1 TABLET BY MOUTH ONCE DAILY 90 Tab 1  
 desonide (DESOWEN) 0.05 % topical ointment Apply  to affected area two (2) times a day. 60 g 3  
 ascorbic acid (VITAMIN C) 250 mg tablet Take 1 Tab by mouth two (2) times daily (with meals). 60 Tab 3  
 aspirin 81 mg chewable tablet Take 1 Tab by mouth daily. 100 Tab 3  
 ferrous sulfate 325 mg (65 mg iron) tablet Take 1 Tab by mouth two (2) times daily (with meals). 60 Tab 3  therapeutic multivitamin (THERAGRAN) tablet Take 1 Tab by mouth daily.  acetaminophen (TYLENOL ARTHRITIS PAIN) 650 mg CR tablet Take 650 mg by mouth every six (6) hours as needed for Pain. Family History Problem Relation Age of Onset  Cancer Mother   
     colon  Alzheimer Mother  Cancer Father   
     bladder  Cancer Sister   
     not sure what kind  Cancer Brother   
     not sure what kind  Heart Disease Maternal Grandmother  Stroke Other   
     at young age Social History Tobacco Use  Smoking status: Never Smoker  Smokeless tobacco: Never Used Substance Use Topics  Alcohol use: No  
 
 
Specialists/Care Team  
Kvng Lee has established care with the following healthcare providers: 
Patient Care Team: 
Rae Colby MD as PCP - General (Family Practice) Lucille Beltran MD (Neurology) Health Risk Assessment Demographics  
female 80 y.o. General Health Questions  
-During the past 4 weeks: 
 -how would you rate your health in general? Good 
 -how often have you been bothered by feeling dizzy when standing up? pt doesn't answer this question and her  is not sure -how much have you been bothered by bodily pain? not 
 -Have you noticed any hearing difficulties?  yes 
 -has your physical and emotional health limited your social activities with family or friends? Pt's  states that she mainly just watches TV and naps but seems happy this way. She spends time with her family. Emotional Health Questions  
-Do you have a history of depression, anxiety, or emotional problems? no 
-Over the past 2 weeks, have you felt down, depressed or hopeless? no 
-Over the past 2 weeks, have you felt little interest or pleasure in doing things? no 
 
Health Habits Please describe your diet habits: Eats a big breakfast. Doesn't eat vegetables. Do you get 5 servings of fruits or vegetables daily? yes Do you exercise regularly? no 
Alcohol Use: None Activities of Daily Living and Functional Status  
-Do you need help with eating, walking, dressing, bathing, toileting, the phone, transportation, shopping, preparing meals, housework, laundry, medications or managing money? Needs help with the bolded ones 
-In the past four weeks, was someone available to help you if you needed and wanted help with anything? yes 
-Are you confident are you that you can control and manage most of your health problems? yes 
-Have you been given information to help you keep track of your medications? yes 
-How often do you have trouble taking your medications as prescribed? Sometimes her  has a hard time getting her to take the second iron pill or GERD medication Fall Risk and Home Safety Have you fallen 2 or more times in the past year? 1 fall out of the bed Does your home have rugs in the hallway, lack grab bars in the bathroom, lack handrails on the stairs or have poor lighting? No railing on the front porch but they never leave the house that way. Do you have smoke detectors and check them regularly? yes Do you have difficulties driving a car? Doesn't drive anymore Do you always fasten your seat belt when you are in a car? No - Advised to do this all of the time Physical Examination Vitals:  
 02/21/19 1358 BP: 124/77 Pulse: 69 Resp: 16  
 Temp: 96.9 °F (36.1 °C) TempSrc: Oral  
SpO2: 95% Weight: 168 lb (76.2 kg) Height: 5' 2\" (1.575 m) Body mass index is 30.73 kg/m². No exam data present Was the patient's timed Up & Go test unsteady or longer than 30 seconds? Unsteady - walks with support. Evaluation of Cognitive Function Mood/affect:  \"I don't worry about it\" Orientation: Person, Place and Situation. Doesn't know the month or year. Appearance: age appropriate, well dressed and within normal Limits Family member/caregiver input:  is inquiring about getting a medical POA form for her. Preventive Services (Preventive care checklist to be included in patient instructions) Discussed today Done Previously Not Needed X  Pneumococcal vaccines X   Flu vaccine X Hepatitis B vaccine (if at risk) X - declines   Shingles vaccine X  TDAP vaccine X  X Mammogram  
  X Pap smear X  X Colorectal cancer screening X Low-dose CT for lung cancer screening X - declines for now   Bone density test  
X   Glaucoma screening X  Cholesterol test  
 X  Diabetes screening test   
  X Diabetes self-management class X Nutritionist referral for diabetes or renal disease Discussion of Advance Directive Discussed with Kvng Miles her ability to prepare and advance directive in the case that an injury or illness causes her to be unable to make health care decisions. Her  would like to get a medical POA. Assessment/Plan  
V70.0 ICD-10-CM ICD-9-CM 1. Stage 3 chronic kidney disease (HCC) M76.1 199.4 METABOLIC PANEL, COMPREHENSIVE 2. Medicare annual wellness visit, subsequent Z00.00 V70.0 CO ANNUAL ALCOHOL SCREEN [address] Orders Placed This Encounter  METABOLIC PANEL, COMPREHENSIVE  
 CKD REPORT  
 CO ANNUAL ALCOHOL SCREEN 15 MIN  
 CO DEPRESSION SCREEN ANNUAL Follow-up Disposition: Not on File Tammy Dumont MD

## 2019-02-22 LAB
ALBUMIN SERPL-MCNC: 4.5 G/DL (ref 3.5–4.7)
ALBUMIN/GLOB SERPL: 1.6 {RATIO} (ref 1.2–2.2)
ALP SERPL-CCNC: 217 IU/L (ref 39–117)
ALT SERPL-CCNC: 47 IU/L (ref 0–32)
AST SERPL-CCNC: 54 IU/L (ref 0–40)
BILIRUB SERPL-MCNC: 1 MG/DL (ref 0–1.2)
BUN SERPL-MCNC: 14 MG/DL (ref 8–27)
BUN/CREAT SERPL: 12 (ref 12–28)
CALCIUM SERPL-MCNC: 10.2 MG/DL (ref 8.7–10.3)
CHLORIDE SERPL-SCNC: 98 MMOL/L (ref 96–106)
CO2 SERPL-SCNC: 17 MMOL/L (ref 20–29)
CREAT SERPL-MCNC: 1.15 MG/DL (ref 0.57–1)
GLOBULIN SER CALC-MCNC: 2.9 G/DL (ref 1.5–4.5)
GLUCOSE SERPL-MCNC: 160 MG/DL (ref 65–99)
INTERPRETATION: NORMAL
POTASSIUM SERPL-SCNC: 4.2 MMOL/L (ref 3.5–5.2)
PROT SERPL-MCNC: 7.4 G/DL (ref 6–8.5)
SODIUM SERPL-SCNC: 137 MMOL/L (ref 134–144)

## 2019-02-22 RX ORDER — AMLODIPINE BESYLATE 10 MG/1
10 TABLET ORAL
Qty: 90 TAB | Refills: 1 | OUTPATIENT
Start: 2019-02-22

## 2019-05-20 DIAGNOSIS — I10 ESSENTIAL HYPERTENSION: ICD-10-CM

## 2019-05-20 DIAGNOSIS — E78.2 MIXED HYPERLIPIDEMIA: ICD-10-CM

## 2019-05-20 RX ORDER — AMLODIPINE BESYLATE 10 MG/1
10 TABLET ORAL
Qty: 90 TAB | Refills: 1 | Status: CANCELLED | OUTPATIENT
Start: 2019-05-20

## 2019-05-20 RX ORDER — ATORVASTATIN CALCIUM 40 MG/1
TABLET, FILM COATED ORAL
Qty: 90 TAB | Refills: 1 | Status: CANCELLED | OUTPATIENT
Start: 2019-05-20

## 2019-05-20 NOTE — TELEPHONE ENCOUNTER
Patient is requesting a refill of Amlodipine 10 milligrams and Atorvastatin 40 milligrams sent to the [facility]. Call taken by service.   Last office visit was 2/21/19

## 2019-05-21 RX ORDER — ATORVASTATIN CALCIUM 40 MG/1
TABLET, FILM COATED ORAL
Qty: 90 TAB | Refills: 1 | Status: SHIPPED | OUTPATIENT
Start: 2019-05-21 | End: 2019-11-18 | Stop reason: SDUPTHER

## 2019-05-21 RX ORDER — AMLODIPINE BESYLATE 10 MG/1
10 TABLET ORAL
Qty: 90 TAB | Refills: 1 | Status: SHIPPED | OUTPATIENT
Start: 2019-05-21 | End: 2019-11-18 | Stop reason: SDUPTHER

## 2019-07-19 DIAGNOSIS — K92.1 GASTROINTESTINAL HEMORRHAGE WITH MELENA: ICD-10-CM

## 2019-07-19 DIAGNOSIS — I10 ESSENTIAL HYPERTENSION: ICD-10-CM

## 2019-07-19 DIAGNOSIS — K44.9 HIATAL HERNIA: ICD-10-CM

## 2019-07-19 NOTE — TELEPHONE ENCOUNTER
----- Message from Danny Guerrero sent at 7/19/2019  9:23 AM EDT -----  Regarding: /Refill  Medication Refill    Caller (if not patient): Lynette Lucero      Relationship of caller (if not patient):spouse      Best contact number(s):[phone]      Name of medication and dosage if known: Pantoprazole 40mg and Carvedilol 6.25mg      Is patient out of this medication (yes/no):Yes      Pharmacy name: [id number] listed in chart? (yes/no):Yes  Pharmacy phone number:[phone]      Details to clarify the request:      Danny Guerrero

## 2019-07-22 RX ORDER — PANTOPRAZOLE SODIUM 40 MG/1
40 TABLET, DELAYED RELEASE ORAL DAILY
Qty: 90 TAB | Refills: 1 | Status: SHIPPED | OUTPATIENT
Start: 2019-07-22 | End: 2020-01-08 | Stop reason: SDUPTHER

## 2019-07-22 RX ORDER — CARVEDILOL 6.25 MG/1
TABLET ORAL
Qty: 180 TAB | Refills: 1 | Status: SHIPPED | OUTPATIENT
Start: 2019-07-22 | End: 2020-01-21 | Stop reason: SDUPTHER

## 2019-08-16 ENCOUNTER — OFFICE VISIT (OUTPATIENT)
Dept: FAMILY MEDICINE CLINIC | Age: 84
End: 2019-08-16

## 2019-08-16 VITALS
WEIGHT: 169 LBS | DIASTOLIC BLOOD PRESSURE: 81 MMHG | HEIGHT: 62 IN | BODY MASS INDEX: 31.1 KG/M2 | TEMPERATURE: 98.1 F | OXYGEN SATURATION: 94 % | SYSTOLIC BLOOD PRESSURE: 125 MMHG | HEART RATE: 69 BPM | RESPIRATION RATE: 20 BRPM

## 2019-08-16 DIAGNOSIS — Z86.73 H/O: STROKE: ICD-10-CM

## 2019-08-16 DIAGNOSIS — E78.2 MIXED HYPERLIPIDEMIA: ICD-10-CM

## 2019-08-16 DIAGNOSIS — I10 ESSENTIAL HYPERTENSION: ICD-10-CM

## 2019-08-16 DIAGNOSIS — I10 ESSENTIAL HYPERTENSION: Primary | ICD-10-CM

## 2019-08-16 NOTE — PATIENT INSTRUCTIONS
A Healthy Lifestyle: Care Instructions  Your Care Instructions    A healthy lifestyle can help you feel good, stay at a healthy weight, and have plenty of energy for both work and play. A healthy lifestyle is something you can share with your whole family. A healthy lifestyle also can lower your risk for serious health problems, such as high blood pressure, heart disease, and diabetes. You can follow a few steps listed below to improve your health and the health of your family. Follow-up care is a key part of your treatment and safety. Be sure to make and go to all appointments, and call your doctor if you are having problems. It's also a good idea to know your test results and keep a list of the medicines you take. How can you care for yourself at home? · Do not eat too much sugar, fat, or fast foods. You can still have dessert and treats now and then. The goal is moderation. · Start small to improve your eating habits. Pay attention to portion sizes, drink less juice and soda pop, and eat more fruits and vegetables. ? Eat a healthy amount of food. A 3-ounce serving of meat, for example, is about the size of a deck of cards. Fill the rest of your plate with vegetables and whole grains. ? Limit the amount of soda and sports drinks you have every day. Drink more water when you are thirsty. ? Eat at least 5 servings of fruits and vegetables every day. It may seem like a lot, but it is not hard to reach this goal. A serving or helping is 1 piece of fruit, 1 cup of vegetables, or 2 cups of leafy, raw vegetables. Have an apple or some carrot sticks as an afternoon snack instead of a candy bar. Try to have fruits and/or vegetables at every meal.  · Make exercise part of your daily routine. You may want to start with simple activities, such as walking, bicycling, or slow swimming. Try to be active 30 to 60 minutes every day. You do not need to do all 30 to 60 minutes all at once.  For example, you can exercise 3 times a day for 10 or 20 minutes. Moderate exercise is safe for most people, but it is always a good idea to talk to your doctor before starting an exercise program.  · Keep moving. Edith Ivon the lawn, work in the garden, or [place]. Take the stairs instead of the elevator at work. · If you smoke, quit. People who smoke have an increased risk for heart attack, stroke, cancer, and other lung illnesses. Quitting is hard, but there are ways to boost your chance of quitting tobacco for good. ? Use nicotine gum, patches, or lozenges. ? Ask your doctor about stop-smoking programs and medicines. ? Keep trying. In addition to reducing your risk of diseases in the future, you will notice some benefits soon after you stop using tobacco. If you have shortness of breath or asthma symptoms, they will likely get better within a few weeks after you quit. · Limit how much alcohol you drink. Moderate amounts of alcohol (up to 2 drinks a day for men, 1 drink a day for women) are okay. But drinking too much can lead to liver problems, high blood pressure, and other health problems. Family health  If you have a family, there are many things you can do together to improve your health. · Eat meals together as a family as often as possible. · Eat healthy foods. This includes fruits, vegetables, lean meats and dairy, and whole grains. · Include your family in your fitness plan. Most people think of activities such as jogging or tennis as the way to fitness, but there are many ways you and your family can be more active. Anything that makes you breathe hard and gets your heart pumping is exercise. Here are some tips:  ? Walk to do errands or to take your child to school or the bus.  ? Go for a family bike ride after dinner instead of watching TV. Where can you learn more? Go to http://ebony-fatoumata.info/. Enter B091 in the search box to learn more about \"A Healthy Lifestyle: Care Instructions. \"  Current as of: September 11, 2018  Content Version: 12.1  © 9301-4761 [employer]. Care instructions adapted under license by [employer] (which disclaims liability or warranty for this information). If you have questions about a medical condition or this instruction, always ask your healthcare professional. [address] any warranty or liability for your use of this information.

## 2019-08-16 NOTE — PROGRESS NOTES
CC: f/u HTN, NAFLD and HLD    HPI: Pt is a 80 y.o. female who presents for f/u HTN and HLD. She has been doing well with no complaints. She also has dementia and is present with her  today. He thinks she has been doing better lately, memory is sharper and she has been more interactive. She still does not do a lot of activity and he notes that she is frequently grouchy, however she reports her mood has been good and seems in good spirits today. Of note, she is still taking a statin and aspirin. The pt, myself and pt's  have discussed stopping this in the past given her age and dementia, however he is concerned that she has a h/o stroke and would like to do anything possible to prevent another stroke and he does not feel like she has had any bad side effects from the medication. She has a h/o NAFLD with mildly elevated LFT's and Abd US c/w fatty infiltration. Her labs have been stable. HTN:  Checking BPs at home?: YES, occasionally  Logs today?: NO  Range of BPs?:  states it is \"good\" but doesn't remember exact values. Adding salt to foods?: Sometimes  Headaches?: NO  Lower extremity edema?: NO  Smoking?: NO      Past Medical History:   Diagnosis Date    Anemia     Arthritis     Cerebral thrombosis with cerebral infarction ([facility] 6/16/2015    Dementia     GERD (gastroesophageal reflux disease)     HLD (hyperlipidemia)     Hypertension     Stroke [facility])     Thyroid disease     thinks had partial resection?  Not on meds and normal TSH 10/2016       Family History   Problem Relation Age of Onset    Cancer Mother         colon    Alzheimer Mother     Cancer Father         bladder    Cancer Sister         not sure what kind    Cancer Brother         not sure what kind    Heart Disease Maternal Grandmother     Stroke Other         at young age       Social History     Tobacco Use    Smoking status: Never Smoker    Smokeless tobacco: Never Used   Substance Use Topics    Alcohol use: No    Drug use: No       ROS:  Per HPI    PE:  Visit Vitals  /81 (BP 1 Location: Left arm, BP Patient Position: Sitting)   Pulse 69   Temp 98.1 °F (36.7 °C) (Oral)   Resp 20   Ht 5' 2\" (1.575 m)   Wt 169 lb (76.7 kg)   SpO2 94%   BMI 30.91 kg/m²     Gen: Pt sitting in chair, in NAD  Head: Normocephalic, atraumatic  Eyes: Sclera anicteric, EOM grossly intact, PERRL  Throat: MMM, normal lips, tongue, teeth and gums  Neck: Supple, no LAD, no thyromegaly or carotid bruits  CVS: Normal S1, S2  Resp: CTAB, no wheezes or rales  Extrem: Atraumatic, no cyanosis or edema  Pulses: 2+   Skin: Warm, dry  Neuro: Alert, oriented, appropriate      A/P: Pt is a 80 y.o. female who presents for f/u HTN, NAFLD, HLD and dementia. - CMP  - Lipid panel  - RTC in 6 months for MWV and labs        Discussed diagnoses in detail with patient. Medication risks/benefits/side effects discussed with patient. All of the patient's questions were addressed. The patient understands and agrees with our plan of care. The patient knows to call back if they are unsure of or forget any changes we discussed today or if the symptoms change. The patient received an After-Visit Summary which contains VS, orders, medication list and allergy list. This can be used as a \"mini-medical record\" should they have to seek medical care while out of town. Current Outpatient Medications on File Prior to Visit   Medication Sig Dispense Refill    pantoprazole (PROTONIX) 40 mg tablet Take 1 Tab by mouth daily. 90 Tab 1    carvedilol (COREG) 6.25 mg tablet TAKE 1 TABLET BY MOUTH TWICE DAILY WITH MEALS 180 Tab 1    atorvastatin (LIPITOR) 40 mg tablet TAKE 1 TABLET BY MOUTH ONCE DAILY 90 Tab 1    amLODIPine (NORVASC) 10 mg tablet Take 1 Tab by mouth nightly. 90 Tab 1    desonide (DESOWEN) 0.05 % topical ointment Apply  to affected area two (2) times a day.  60 g 3    ascorbic acid (VITAMIN C) 250 mg tablet Take 1 Tab by mouth two (2) times daily (with meals). 60 Tab 3    aspirin 81 mg chewable tablet Take 1 Tab by mouth daily. 100 Tab 3    ferrous sulfate 325 mg (65 mg iron) tablet Take 1 Tab by mouth two (2) times daily (with meals). 60 Tab 3    therapeutic multivitamin (THERAGRAN) tablet Take 1 Tab by mouth daily.  acetaminophen (TYLENOL ARTHRITIS PAIN) 650 mg CR tablet Take 650 mg by mouth every six (6) hours as needed for Pain. No current facility-administered medications on file prior to visit.

## 2019-08-16 NOTE — PROGRESS NOTES
1. Have you been to the ER, urgent care clinic since your last visit? Hospitalized since your last visit? No    2. Have you seen or consulted any other health care providers outside of the [address] [place] [address] since your last visit? Include any pap smears or colon screening.  No  Reviewed record in preparation for visit and have necessary documentation  Pt did not bring medication to office visit for review    Goals that were addressed and/or need to be completed during or after this appointment include   Health Maintenance Due   Topic Date Due    Shingrix Vaccine Age 49> (1 of 2) 03/16/1985    Bone Densitometry (Dexa) Screening  03/16/2000    GLAUCOMA SCREENING Q2Y  08/20/2017    Influenza Age 9 to Adult  08/01/2019

## 2019-08-17 LAB
ALBUMIN SERPL-MCNC: 4.3 G/DL (ref 3.5–4.7)
ALBUMIN/GLOB SERPL: 1.6 {RATIO} (ref 1.2–2.2)
ALP SERPL-CCNC: 217 IU/L (ref 39–117)
ALT SERPL-CCNC: 38 IU/L (ref 0–32)
AST SERPL-CCNC: 50 IU/L (ref 0–40)
BILIRUB SERPL-MCNC: 1.2 MG/DL (ref 0–1.2)
BUN SERPL-MCNC: 15 MG/DL (ref 8–27)
BUN/CREAT SERPL: 13 (ref 12–28)
CALCIUM SERPL-MCNC: 9.7 MG/DL (ref 8.7–10.3)
CHLORIDE SERPL-SCNC: 100 MMOL/L (ref 96–106)
CHOLEST SERPL-MCNC: 120 MG/DL (ref 100–199)
CO2 SERPL-SCNC: 23 MMOL/L (ref 20–29)
CREAT SERPL-MCNC: 1.15 MG/DL (ref 0.57–1)
GLOBULIN SER CALC-MCNC: 2.7 G/DL (ref 1.5–4.5)
GLUCOSE SERPL-MCNC: 81 MG/DL (ref 65–99)
HDLC SERPL-MCNC: 42 MG/DL
LDLC SERPL CALC-MCNC: 30 MG/DL (ref 0–99)
POTASSIUM SERPL-SCNC: 4.3 MMOL/L (ref 3.5–5.2)
PROT SERPL-MCNC: 7 G/DL (ref 6–8.5)
SODIUM SERPL-SCNC: 139 MMOL/L (ref 134–144)
TRIGL SERPL-MCNC: 239 MG/DL (ref 0–149)
VLDLC SERPL CALC-MCNC: 48 MG/DL (ref 5–40)

## 2019-11-18 DIAGNOSIS — E78.2 MIXED HYPERLIPIDEMIA: ICD-10-CM

## 2019-11-18 DIAGNOSIS — I10 ESSENTIAL HYPERTENSION: ICD-10-CM

## 2019-11-19 RX ORDER — AMLODIPINE BESYLATE 10 MG/1
10 TABLET ORAL
Qty: 90 TAB | Refills: 1 | Status: SHIPPED | OUTPATIENT
Start: 2019-11-19 | End: 2020-04-02

## 2019-11-19 RX ORDER — ATORVASTATIN CALCIUM 40 MG/1
TABLET, FILM COATED ORAL
Qty: 90 TAB | Refills: 1 | Status: SHIPPED | OUTPATIENT
Start: 2019-11-19 | End: 2020-04-02

## 2020-01-08 DIAGNOSIS — K92.1 GASTROINTESTINAL HEMORRHAGE WITH MELENA: ICD-10-CM

## 2020-01-08 DIAGNOSIS — K44.9 HIATAL HERNIA: ICD-10-CM

## 2020-01-08 RX ORDER — PANTOPRAZOLE SODIUM 40 MG/1
40 TABLET, DELAYED RELEASE ORAL DAILY
Qty: 90 TAB | Refills: 1 | Status: SHIPPED | OUTPATIENT
Start: 2020-01-08 | End: 2020-04-02

## 2020-01-08 NOTE — TELEPHONE ENCOUNTER
----- Message from [employer] sent at 1/8/2020 10:57 AM EST -----  Regarding: Jenkins/ Refill  Contact: [phone]  Caller (if not patient): Mr. Stefania Rosa  Relationship of caller (if not patient):   Best contact number(s): [phone]  Name of medication and dosage if known:  Pantoprazole 40mg  Is patient out of this medication (yes/no):  No Four day supply on hand  Pharmacy name: [facility] listed in chart? (yes/no): Yes  Pharmacy phone number:   Date of last visit:               Friday, August 16, 2019 01:40 PM

## 2020-01-21 ENCOUNTER — TELEPHONE (OUTPATIENT)
Dept: FAMILY MEDICINE CLINIC | Age: 85
End: 2020-01-21

## 2020-01-21 DIAGNOSIS — I10 ESSENTIAL HYPERTENSION: ICD-10-CM

## 2020-01-21 RX ORDER — CARVEDILOL 6.25 MG/1
TABLET ORAL
Qty: 180 TAB | Refills: 1 | Status: SHIPPED | OUTPATIENT
Start: 2020-01-21 | End: 2020-10-19 | Stop reason: SDUPTHER

## 2020-01-21 NOTE — TELEPHONE ENCOUNTER
----- Message from Santino Garrett sent at 1/21/2020  9:47 AM EST -----  Regarding: Dr. Sallyann Riedel (if not patient): patient's       Relationship of caller (if not patient):      Best contact number(s):[phone]      Name of medication and dosage if known: Carvedilol      Is patient out of this medication (yes/no):yes      Pharmacy name:[facility] in [address] listed in chart? (yes/no):no  Pharmacy phone number:      Details to clarify the request:Patient is requesting a refill of Carvedilol 6.5 milligrams sent to the [facility] pharmacy in [place] [address]

## 2020-01-21 NOTE — TELEPHONE ENCOUNTER
Left message for patient's  to call. Per Dr. Michael Jeong, patient needs an appointment in February for medicare wellness and lab work.

## 2020-01-21 NOTE — TELEPHONE ENCOUNTER
Called patient's  and informed him that rx was called into [facility] in [facility].

## 2020-01-21 NOTE — TELEPHONE ENCOUNTER
Pt's  is asking that the medication be sent to [facility] in [place], [place]. I took out the other Pharmacies. He says he gets no discount from his insurance if he uses another Pharmacy for her. Please call him once it is done so he can go and  the medication.  Thanks

## 2020-02-07 ENCOUNTER — TELEPHONE (OUTPATIENT)
Dept: FAMILY MEDICINE CLINIC | Age: 85
End: 2020-02-07

## 2020-02-07 NOTE — TELEPHONE ENCOUNTER
----- Message from Cristina Schmitz sent at 2/7/2020  9:50 AM EST -----  Regarding: MD Jenkins/ telephone  Contact: [phone]  Caller's first and last name: pt  Reason for call: Pharmacy change  Callback required yes/no and why: yes  Best contact number(s):[phone]  Details to clarify the request:  Pt would like to change pharmacy on file to [facility] in [place], [place]. Pharmacy number is [phone].

## 2020-02-24 ENCOUNTER — HOSPITAL ENCOUNTER (OUTPATIENT)
Dept: LAB | Age: 85
Discharge: HOME | End: 2020-02-24

## 2020-02-24 ENCOUNTER — OFFICE VISIT (OUTPATIENT)
Dept: FAMILY MEDICINE CLINIC | Age: 85
End: 2020-02-24

## 2020-02-24 VITALS
HEART RATE: 81 BPM | RESPIRATION RATE: 20 BRPM | BODY MASS INDEX: 32.2 KG/M2 | WEIGHT: 175 LBS | HEIGHT: 62 IN | OXYGEN SATURATION: 95 % | SYSTOLIC BLOOD PRESSURE: 127 MMHG | DIASTOLIC BLOOD PRESSURE: 75 MMHG | TEMPERATURE: 96.8 F

## 2020-02-24 DIAGNOSIS — I67.2 CEREBRAL ATHEROSCLEROSIS: ICD-10-CM

## 2020-02-24 DIAGNOSIS — I10 ESSENTIAL HYPERTENSION: ICD-10-CM

## 2020-02-24 DIAGNOSIS — N18.30 STAGE 3 CHRONIC KIDNEY DISEASE (HCC): ICD-10-CM

## 2020-02-24 DIAGNOSIS — Z86.73 H/O: CVA (CEREBROVASCULAR ACCIDENT): ICD-10-CM

## 2020-02-24 DIAGNOSIS — F02.80 LATE ONSET ALZHEIMER'S DISEASE WITHOUT BEHAVIORAL DISTURBANCE (HCC): ICD-10-CM

## 2020-02-24 DIAGNOSIS — Z00.00 MEDICARE ANNUAL WELLNESS VISIT, SUBSEQUENT: Primary | ICD-10-CM

## 2020-02-24 DIAGNOSIS — G30.1 LATE ONSET ALZHEIMER'S DISEASE WITHOUT BEHAVIORAL DISTURBANCE (HCC): ICD-10-CM

## 2020-02-24 DIAGNOSIS — Z13.39 SCREENING FOR ALCOHOLISM: ICD-10-CM

## 2020-02-24 LAB
ALBUMIN SERPL-MCNC: 3.7 G/DL (ref 3.5–5)
ALBUMIN/GLOB SERPL: 1 {RATIO} (ref 1.1–2.2)
ALP SERPL-CCNC: 216 U/L (ref 45–117)
ALT SERPL-CCNC: 45 U/L (ref 12–78)
ANION GAP SERPL CALC-SCNC: 6 MMOL/L (ref 5–15)
AST SERPL-CCNC: 44 U/L (ref 15–37)
BILIRUB SERPL-MCNC: 1.2 MG/DL (ref 0.2–1)
BUN SERPL-MCNC: 22 MG/DL (ref 6–20)
BUN/CREAT SERPL: 17 (ref 12–20)
CALCIUM SERPL-MCNC: 9.5 MG/DL (ref 8.5–10.1)
CHLORIDE SERPL-SCNC: 105 MMOL/L (ref 97–108)
CHOLEST SERPL-MCNC: 117 MG/DL
CO2 SERPL-SCNC: 27 MMOL/L (ref 21–32)
CREAT SERPL-MCNC: 1.26 MG/DL (ref 0.55–1.02)
GLOBULIN SER CALC-MCNC: 3.6 G/DL (ref 2–4)
GLUCOSE SERPL-MCNC: 146 MG/DL (ref 65–100)
HDLC SERPL-MCNC: 48 MG/DL
HDLC SERPL: 2.4 {RATIO} (ref 0–5)
LDLC SERPL CALC-MCNC: 23.2 MG/DL (ref 0–100)
LIPID PROFILE,FLP: ABNORMAL
POTASSIUM SERPL-SCNC: 4.6 MMOL/L (ref 3.5–5.1)
PROT SERPL-MCNC: 7.3 G/DL (ref 6.4–8.2)
SODIUM SERPL-SCNC: 138 MMOL/L (ref 136–145)
TRIGL SERPL-MCNC: 229 MG/DL (ref ?–150)
VLDLC SERPL CALC-MCNC: 45.8 MG/DL

## 2020-02-24 NOTE — PROGRESS NOTES
This is the Subsequent Medicare Annual Wellness Exam, performed 12 months or more after the Initial AWV or the last Subsequent AWV    I have reviewed the patient's medical history in detail and updated the computerized patient record. History     Patient Active Problem List   Diagnosis Code    GERD (gastroesophageal reflux disease) K21.9    Hypertension I10    Arthritis M19.90    GI bleed K92.2    Symptomatic anemia D64.9    Right hemiparesis ([facility] [address] G81.91    On statin therapy Z79.899    Antiplatelet or antithrombotic long-term use Z79.02    Late onset Alzheimer's disease without behavioral disturbance (HCC) G30.1, F02.80    Stage 3 chronic kidney disease (HCC) N18.3     Past Medical History:   Diagnosis Date    Anemia     Arthritis     Cerebral thrombosis with cerebral infarction ([facility] [address] 6/16/2015    Dementia ([facility] [address]     GERD (gastroesophageal reflux disease)     HLD (hyperlipidemia)     Hypertension     Stroke ([facility] [address]     Thyroid disease     thinks had partial resection? Not on meds and normal TSH 10/2016      Past Surgical History:   Procedure Laterality Date    HX CHOLECYSTECTOMY      HX HYSTERECTOMY      Can't remember why but is sure she never had cancer. Current Outpatient Medications   Medication Sig Dispense Refill    carvediloL (COREG) 6.25 mg tablet TAKE 1 TABLET BY MOUTH TWICE DAILY WITH MEALS 180 Tab 1    pantoprazole (PROTONIX) 40 mg tablet Take 1 Tab by mouth daily. 90 Tab 1    amLODIPine (NORVASC) 10 mg tablet Take 1 Tab by mouth nightly. 90 Tab 1    atorvastatin (LIPITOR) 40 mg tablet TAKE 1 TABLET BY MOUTH ONCE DAILY 90 Tab 1    ascorbic acid (VITAMIN C) 250 mg tablet Take 1 Tab by mouth two (2) times daily (with meals). 60 Tab 3    aspirin 81 mg chewable tablet Take 1 Tab by mouth daily. 100 Tab 3    ferrous sulfate 325 mg (65 mg iron) tablet Take 1 Tab by mouth two (2) times daily (with meals).  60 Tab 3    therapeutic multivitamin (THERAGRAN) tablet Take 1 Tab by mouth daily.  acetaminophen (TYLENOL ARTHRITIS PAIN) 650 mg CR tablet Take 650 mg by mouth every six (6) hours as needed for Pain. No Known Allergies    Family History   Problem Relation Age of Onset    Cancer Mother         colon   Uzair Hock Alzheimer Mother     Cancer Father         bladder    Cancer Sister         not sure what kind    Cancer Brother         not sure what kind    Heart Disease Maternal Grandmother     Stroke Other         at young age     Social History     Tobacco Use    Smoking status: Never Smoker    Smokeless tobacco: Never Used   Substance Use Topics    Alcohol use: No       Depression Risk Factor Screening:     3 most recent PHQ Screens 2/24/2020   Little interest or pleasure in doing things Not at all   Feeling down, depressed, irritable, or hopeless Not at all   Total Score PHQ 2 0       Alcohol Risk Factor Screening:   Do you average 1 drink per night or more than 7 drinks a week:  No    On any one occasion in the past three months have you have had more than 3 drinks containing alcohol:  No      Functional Ability and Level of Safety:   Hearing: Hearing is good. Activities of Daily Living: The home contains: grab bars and poor lighting (by choice). Pt;s  states she always turns all the lights low and then he will turn them back up. Patient needs help with:  phone, transportation, shopping, preparing meals, laundry, housework, managing medications, managing money, dressing, bathing, bathroom needs and walking    Ambulation: Uses a walker for long distances and for short distances uses assist from her     Fall Risk:  Fall Risk Assessment, last 12 mths 2/24/2020   Able to walk? Yes   Fall in past 12 months?  No   Fall with injury? -   Number of falls in past 12 months -   Fall Risk Score -       Abuse Screen:  Patient is not abused    Cognitive Screening   Has your family/caregiver stated any concerns about your memory: yes - known dementia      Patient Care Team   Patient Care Team:  Shai Stokes MD as PCP - General (Family Practice)  Shai Stokes MD as PCP - [facility] Empaneled Provider  Julian Bingham MD (Neurology)    Assessment/Plan   Education and counseling provided:  Are appropriate based on today's review and evaluation  End-of-Life planning (with patient's consent)  Bone mass measurement (DEXA)  Screening for glaucoma    Diagnoses and all orders for this visit:    1. Medicare annual wellness visit, subsequent: Pt declines recommended vaccines and DEXA screening. She is not interested in getting her eyes or hearing checked. She has dementia that is progressing slowly but is happy without signs of behavioral disturbance. She appears to be safe at home and they have all the equipment they need for her. 2. Screening for alcoholism  -     [place] ANNUAL ALCOHOL SCREEN 15 MIN    3. Essential hypertension: Controlled on current medications  -     METABOLIC PANEL, COMPREHENSIVE; Future    4. H/O: CVA (cerebrovascular accident)  -     LIPID PANEL; Future    5. Cerebral atherosclerosis   -     LIPID PANEL; Future    6.  Stage 3 chronic kidney disease ([facility] [address]    7. Late onset Alzheimer's disease without behavioral disturbance [facility])        Health Maintenance Due   Topic Date Due    Shingrix Vaccine Age 49> (1 of 2) 03/16/1985    Bone Densitometry (Dexa) Screening  03/16/2000    GLAUCOMA SCREENING Q2Y  08/20/2017    Influenza Age 5 to Adult  08/01/2019    Medicare Yearly Exam  02/22/2020

## 2020-02-24 NOTE — PROGRESS NOTES
1. Have you been to the ER, urgent care clinic since your last visit? Hospitalized since your last visit? no    2. Have you seen or consulted any other health care providers outside of the [address] [place] [address] since your last visit? Include any pap smears or colon screening. No  Reviewed record in preparation for visit and have obtained necessary documentation. Patient did not bring medications to visit for review. Information provided on Advanced Directive, Living Will. Body mass index is 32.01 kg/m².    Health Maintenance Due   Topic Date Due    Shingrix Vaccine Age 49> (1 of 2) 03/16/1985    Bone Densitometry (Dexa) Screening  03/16/2000    GLAUCOMA SCREENING Q2Y  08/20/2017    Influenza Age 9 to Adult  08/01/2019    Medicare Yearly Exam  02/22/2020

## 2020-05-12 DIAGNOSIS — I10 ESSENTIAL HYPERTENSION: ICD-10-CM

## 2020-05-12 DIAGNOSIS — E78.2 MIXED HYPERLIPIDEMIA: ICD-10-CM

## 2020-05-12 RX ORDER — ATORVASTATIN CALCIUM 40 MG/1
40 TABLET, FILM COATED ORAL DAILY
Qty: 90 TAB | Refills: 1 | Status: SHIPPED | OUTPATIENT
Start: 2020-05-12 | End: 2020-11-10 | Stop reason: SDUPTHER

## 2020-05-12 RX ORDER — AMLODIPINE BESYLATE 10 MG/1
10 TABLET ORAL DAILY
Qty: 90 TAB | Refills: 1 | Status: SHIPPED | OUTPATIENT
Start: 2020-05-12 | End: 2020-11-10 | Stop reason: SDUPTHER

## 2020-05-12 NOTE — TELEPHONE ENCOUNTER
----- Message from Brooklyn Vargas sent at 5/12/2020  9:34 AM EDT -----  Regarding: Dr. Margherita Leyden  Pt needs refill on amlodipine 10 mg and  atorvastatin [facility] [address] [phone]. Best contact [phone].

## 2020-06-29 DIAGNOSIS — K44.9 HIATAL HERNIA: ICD-10-CM

## 2020-06-29 DIAGNOSIS — K92.1 GASTROINTESTINAL HEMORRHAGE WITH MELENA: ICD-10-CM

## 2020-06-29 RX ORDER — PANTOPRAZOLE SODIUM 40 MG/1
TABLET, DELAYED RELEASE ORAL
Qty: 90 TAB | Refills: 1 | Status: SHIPPED | OUTPATIENT
Start: 2020-06-29 | End: 2021-01-04 | Stop reason: SDUPTHER

## 2020-06-29 NOTE — TELEPHONE ENCOUNTER
Pt requesting Rx be sent to [address] [facility] in [place]. [address] [facility] number:[phone]

## 2020-10-19 DIAGNOSIS — I10 ESSENTIAL HYPERTENSION: ICD-10-CM

## 2020-10-20 RX ORDER — CARVEDILOL 6.25 MG/1
TABLET ORAL
Qty: 180 TAB | Refills: 1 | Status: SHIPPED | OUTPATIENT
Start: 2020-10-20 | End: 2021-04-19 | Stop reason: SDUPTHER

## 2020-10-29 ENCOUNTER — OFFICE VISIT (OUTPATIENT)
Dept: FAMILY MEDICINE CLINIC | Age: 85
End: 2020-10-29
Payer: MEDICARE

## 2020-10-29 VITALS
RESPIRATION RATE: 20 BRPM | BODY MASS INDEX: 32.2 KG/M2 | HEIGHT: 62 IN | WEIGHT: 175 LBS | DIASTOLIC BLOOD PRESSURE: 70 MMHG | SYSTOLIC BLOOD PRESSURE: 109 MMHG | HEART RATE: 77 BPM | OXYGEN SATURATION: 95 % | TEMPERATURE: 97.8 F

## 2020-10-29 DIAGNOSIS — I10 ESSENTIAL HYPERTENSION: Primary | ICD-10-CM

## 2020-10-29 DIAGNOSIS — E78.2 MIXED HYPERLIPIDEMIA: ICD-10-CM

## 2020-10-29 LAB
ANION GAP SERPL CALC-SCNC: 9 MMOL/L (ref 5–15)
BUN SERPL-MCNC: 20 MG/DL (ref 6–20)
BUN/CREAT SERPL: 16 (ref 12–20)
CALCIUM SERPL-MCNC: 10 MG/DL (ref 8.5–10.1)
CHLORIDE SERPL-SCNC: 103 MMOL/L (ref 97–108)
CO2 SERPL-SCNC: 28 MMOL/L (ref 21–32)
CREAT SERPL-MCNC: 1.23 MG/DL (ref 0.55–1.02)
GLUCOSE SERPL-MCNC: 138 MG/DL (ref 65–100)
POTASSIUM SERPL-SCNC: 4.4 MMOL/L (ref 3.5–5.1)
SODIUM SERPL-SCNC: 140 MMOL/L (ref 136–145)

## 2020-10-29 PROCEDURE — 99213 OFFICE O/P EST LOW 20 MIN: CPT | Performed by: FAMILY MEDICINE

## 2020-10-29 PROCEDURE — G8536 NO DOC ELDER MAL SCRN: HCPCS | Performed by: FAMILY MEDICINE

## 2020-10-29 PROCEDURE — G8400 PT W/DXA NO RESULTS DOC: HCPCS | Performed by: FAMILY MEDICINE

## 2020-10-29 PROCEDURE — G8752 SYS BP LESS 140: HCPCS | Performed by: FAMILY MEDICINE

## 2020-10-29 PROCEDURE — 1090F PRES/ABSN URINE INCON ASSESS: CPT | Performed by: FAMILY MEDICINE

## 2020-10-29 PROCEDURE — G8510 SCR DEP NEG, NO PLAN REQD: HCPCS | Performed by: FAMILY MEDICINE

## 2020-10-29 PROCEDURE — G8427 DOCREV CUR MEDS BY ELIG CLIN: HCPCS | Performed by: FAMILY MEDICINE

## 2020-10-29 PROCEDURE — G8754 DIAS BP LESS 90: HCPCS | Performed by: FAMILY MEDICINE

## 2020-10-29 PROCEDURE — G8417 CALC BMI ABV UP PARAM F/U: HCPCS | Performed by: FAMILY MEDICINE

## 2020-10-29 PROCEDURE — 1101F PT FALLS ASSESS-DOCD LE1/YR: CPT | Performed by: FAMILY MEDICINE

## 2020-10-29 NOTE — PROGRESS NOTES
1. Have you been to the ER, urgent care clinic since your last visit? Hospitalized since your last visit? No    2. Have you seen or consulted any other health care providers outside of the [address] [place] [address] since your last visit? Include any pap smears or colon screening.  No  Reviewed record in preparation for visit and have necessary documentation  Pt did not bring medication to office visit for review    Goals that were addressed and/or need to be completed during or after this appointment include   Health Maintenance Due   Topic Date Due    Shingrix Vaccine Age 49> (1 of 2) 03/16/1985    Bone Densitometry (Dexa) Screening  03/16/2000    GLAUCOMA SCREENING Q2Y  08/20/2017

## 2020-10-29 NOTE — PROGRESS NOTES
CC: f/u HTN and HLD    HPI: Pt is a 80 y.o. female who presents for f/u HTN and HLD. She is feeling well without complaints. Her dementia seems stable and memory symptoms are not worsening. HTN:  Checking BPs at home?: YES  Logs today?: NO  Range of BPs?: 081-759'X systolic, unsure of diastolics  Headaches?: NO  Blurry vision?: NO  Lower extremity edema?: NO  Smoking?: NO        Past Medical History:   Diagnosis Date    Anemia     Arthritis     Cerebral thrombosis with cerebral infarction ([facility] [address] 6/16/2015    Dementia ([facility] [address]     GERD (gastroesophageal reflux disease)     HLD (hyperlipidemia)     Hypertension     Stroke ([facility] [address]     Thyroid disease     thinks had partial resection? Not on meds and normal TSH 10/2016       Family History   Problem Relation Age of Onset    Cancer Mother         colon    Alzheimer Mother     Cancer Father         bladder    Cancer Sister         not sure what kind    Cancer Brother         not sure what kind    Heart Disease Maternal Grandmother     Stroke Other         at young age       Social History     Tobacco Use    Smoking status: Never Smoker    Smokeless tobacco: Never Used   Substance Use Topics    Alcohol use: No    Drug use: No       ROS:  Per HPI    PE:  Visit Vitals  /70 (BP 1 Location: Left arm, BP Patient Position: Sitting)   Pulse 77   Temp 97.8 °F (36.6 °C) (Temporal)   Resp 20   Ht 5' 2\" (1.575 m)   Wt 175 lb (79.4 kg)   SpO2 95%   BMI 32.01 kg/m²     Gen: Pt sitting in chair, in NAD  Head: Normocephalic, atraumatic  Eyes: Sclera anicteric, EOM grossly intact, PERRL  Throat: Mask in place  Neck: Supple  CVS: Normal S1, S2, no m/r/g  Resp: CTAB, no wheezes or rales  Extrem: Atraumatic, no cyanosis. 1+ edema to ankles b/l  Pulses: 2+   Skin: Warm, dry  Neuro: Alert, appropriate      A/P:   Encounter Diagnoses     ICD-10-CM ICD-9-CM   1. Essential hypertension  I10 401.9   2. Mixed hyperlipidemia  E78.2 272.2     1.  Essential hypertension: BP well controlled  - METABOLIC PANEL, BASIC; Future  - METABOLIC PANEL, BASIC    2. Mixed hyperlipidemia: Has been stable. Still on statin despite age and dementia because she has a h/o CVA. Have discussed goals of care with pt and  in the past and they understand the risks and benefits of statin medication and would like to continue. RTC in 6 months for virtual visit to f/u chronic conditions    Discussed diagnoses in detail with patient. Medication risks/benefits/side effects discussed with patient. All of the patient's questions were addressed. The patient understands and agrees with our plan of care. The patient knows to call back if they are unsure of or forget any changes we discussed today or if the symptoms change. The patient received an After-Visit Summary which contains VS, orders, medication list and allergy list. This can be used as a \"mini-medical record\" should they have to seek medical care while out of town. Current Outpatient Medications on File Prior to Visit   Medication Sig Dispense Refill    carvediloL (COREG) 6.25 mg tablet TAKE 1 TABLET BY MOUTH TWICE DAILY WITH MEALS 180 Tab 1    pantoprazole (PROTONIX) 40 mg tablet Take 1 tablet by mouth once daily 90 Tab 1    amLODIPine (NORVASC) 10 mg tablet Take 1 Tab by mouth daily. 90 Tab 1    atorvastatin (LIPITOR) 40 mg tablet Take 1 Tab by mouth daily. 90 Tab 1    ascorbic acid (VITAMIN C) 250 mg tablet Take 1 Tab by mouth two (2) times daily (with meals). 60 Tab 3    aspirin 81 mg chewable tablet Take 1 Tab by mouth daily. 100 Tab 3    ferrous sulfate 325 mg (65 mg iron) tablet Take 1 Tab by mouth two (2) times daily (with meals). 60 Tab 3    therapeutic multivitamin (THERAGRAN) tablet Take 1 Tab by mouth daily.  acetaminophen (TYLENOL ARTHRITIS PAIN) 650 mg CR tablet Take 650 mg by mouth every six (6) hours as needed for Pain. No current facility-administered medications on file prior to visit.

## 2020-10-29 NOTE — PATIENT INSTRUCTIONS
A Healthy Lifestyle: Care Instructions Your Care Instructions A healthy lifestyle can help you feel good, stay at a healthy weight, and have plenty of energy for both work and play. A healthy lifestyle is something you can share with your whole family. A healthy lifestyle also can lower your risk for serious health problems, such as high blood pressure, heart disease, and diabetes. You can follow a few steps listed below to improve your health and the health of your family. Follow-up care is a key part of your treatment and safety. Be sure to make and go to all appointments, and call your doctor if you are having problems. It's also a good idea to know your test results and keep a list of the medicines you take. How can you care for yourself at home? · Do not eat too much sugar, fat, or fast foods. You can still have dessert and treats now and then. The goal is moderation. · Start small to improve your eating habits. Pay attention to portion sizes, drink less juice and soda pop, and eat more fruits and vegetables. ? Eat a healthy amount of food. A 3-ounce serving of meat, for example, is about the size of a deck of cards. Fill the rest of your plate with vegetables and whole grains. ? Limit the amount of soda and sports drinks you have every day. Drink more water when you are thirsty. ? Eat at least 5 servings of fruits and vegetables every day. It may seem like a lot, but it is not hard to reach this goal. A serving or helping is 1 piece of fruit, 1 cup of vegetables, or 2 cups of leafy, raw vegetables. Have an apple or some carrot sticks as an afternoon snack instead of a candy bar. Try to have fruits and/or vegetables at every meal. 
· Make exercise part of your daily routine. You may want to start with simple activities, such as walking, bicycling, or slow swimming. Try to be active 30 to 60 minutes every day.  You do not need to do all 30 to 60 minutes all at once. For example, you can exercise 3 times a day for 10 or 20 minutes. Moderate exercise is safe for most people, but it is always a good idea to talk to your doctor before starting an exercise program. 
· Keep moving. Beulah Held the lawn, work in the garden, or Qoof. Take the stairs instead of the elevator at work. · If you smoke, quit. People who smoke have an increased risk for heart attack, stroke, cancer, and other lung illnesses. Quitting is hard, but there are ways to boost your chance of quitting tobacco for good. ? Use nicotine gum, patches, or lozenges. ? Ask your doctor about stop-smoking programs and medicines. ? Keep trying. In addition to reducing your risk of diseases in the future, you will notice some benefits soon after you stop using tobacco. If you have shortness of breath or asthma symptoms, they will likely get better within a few weeks after you quit. · Limit how much alcohol you drink. Moderate amounts of alcohol (up to 2 drinks a day for men, 1 drink a day for women) are okay. But drinking too much can lead to liver problems, high blood pressure, and other health problems. Family health If you have a family, there are many things you can do together to improve your health. · Eat meals together as a family as often as possible. · Eat healthy foods. This includes fruits, vegetables, lean meats and dairy, and whole grains. · Include your family in your fitness plan. Most people think of activities such as jogging or tennis as the way to fitness, but there are many ways you and your family can be more active. Anything that makes you breathe hard and gets your heart pumping is exercise. Here are some tips: 
? Walk to do errands or to take your child to school or the bus. 
? Go for a family bike ride after dinner instead of watching TV. Where can you learn more? Go to http://www.[employer]/ Enter C240 in the search box to learn more about \"A Healthy Lifestyle: Care Instructions. \" Current as of: January 31, 2020               Content Version: 12.6 © 8502-6321 [employer]. Care instructions adapted under license by [employer] (which disclaims liability or warranty for this information). If you have questions about a medical condition or this instruction, always ask your healthcare professional. [address] any warranty or liability for your use of this information.

## 2020-11-10 DIAGNOSIS — I10 ESSENTIAL HYPERTENSION: ICD-10-CM

## 2020-11-10 DIAGNOSIS — E78.2 MIXED HYPERLIPIDEMIA: ICD-10-CM

## 2020-11-10 RX ORDER — ATORVASTATIN CALCIUM 40 MG/1
40 TABLET, FILM COATED ORAL DAILY
Qty: 90 TAB | Refills: 1 | Status: SHIPPED | OUTPATIENT
Start: 2020-11-10 | End: 2021-05-10 | Stop reason: SDUPTHER

## 2020-11-10 RX ORDER — AMLODIPINE BESYLATE 10 MG/1
10 TABLET ORAL DAILY
Qty: 90 TAB | Refills: 1 | Status: SHIPPED | OUTPATIENT
Start: 2020-11-10 | End: 2021-05-10 | Stop reason: SDUPTHER

## 2021-01-04 DIAGNOSIS — K92.1 GASTROINTESTINAL HEMORRHAGE WITH MELENA: ICD-10-CM

## 2021-01-04 DIAGNOSIS — K44.9 HIATAL HERNIA: ICD-10-CM

## 2021-01-05 RX ORDER — PANTOPRAZOLE SODIUM 40 MG/1
TABLET, DELAYED RELEASE ORAL
Qty: 90 TAB | Refills: 1 | Status: SHIPPED | OUTPATIENT
Start: 2021-01-05 | End: 2021-07-01 | Stop reason: SDUPTHER

## 2021-03-17 ENCOUNTER — OFFICE VISIT (OUTPATIENT)
Dept: FAMILY MEDICINE CLINIC | Age: 86
End: 2021-03-17
Payer: MEDICARE

## 2021-03-17 VITALS
WEIGHT: 175 LBS | HEART RATE: 79 BPM | DIASTOLIC BLOOD PRESSURE: 78 MMHG | HEIGHT: 62 IN | BODY MASS INDEX: 32.2 KG/M2 | RESPIRATION RATE: 18 BRPM | OXYGEN SATURATION: 96 % | SYSTOLIC BLOOD PRESSURE: 127 MMHG | TEMPERATURE: 97.5 F

## 2021-03-17 DIAGNOSIS — M25.561 CHRONIC PAIN OF RIGHT KNEE: ICD-10-CM

## 2021-03-17 DIAGNOSIS — S69.91XA INJURY OF RIGHT WRIST, INITIAL ENCOUNTER: ICD-10-CM

## 2021-03-17 DIAGNOSIS — G89.29 CHRONIC PAIN OF RIGHT KNEE: ICD-10-CM

## 2021-03-17 DIAGNOSIS — S40.021A CONTUSION OF RIGHT UPPER ARM, INITIAL ENCOUNTER: ICD-10-CM
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
## 2021-03-17 DIAGNOSIS — S09.90XA INJURY OF HEAD, INITIAL ENCOUNTER: Primary | ICD-10-CM

## 2021-03-17 PROCEDURE — G8536 NO DOC ELDER MAL SCRN: HCPCS | Performed by: FAMILY MEDICINE

## 2021-03-17 PROCEDURE — G8510 SCR DEP NEG, NO PLAN REQD: HCPCS | Performed by: FAMILY MEDICINE

## 2021-03-17 PROCEDURE — G8427 DOCREV CUR MEDS BY ELIG CLIN: HCPCS | Performed by: FAMILY MEDICINE

## 2021-03-17 PROCEDURE — 1101F PT FALLS ASSESS-DOCD LE1/YR: CPT | Performed by: FAMILY MEDICINE

## 2021-03-17 PROCEDURE — 99213 OFFICE O/P EST LOW 20 MIN: CPT | Performed by: FAMILY MEDICINE

## 2021-03-17 PROCEDURE — 1090F PRES/ABSN URINE INCON ASSESS: CPT | Performed by: FAMILY MEDICINE

## 2021-03-17 PROCEDURE — G8417 CALC BMI ABV UP PARAM F/U: HCPCS | Performed by: FAMILY MEDICINE

## 2021-03-17 RX ORDER — DICLOFENAC SODIUM 10 MG/G
1 GEL TOPICAL 4 TIMES DAILY
Qty: 150 G | Refills: 1 | Status: SHIPPED | OUTPATIENT
Start: 2021-03-17 | End: 2022-07-01 | Stop reason: ALTCHOICE

## 2021-03-17 NOTE — PROGRESS NOTES
Chief Complaint   Patient presents with    Fall    Wrist Pain     right     Visit Vitals  /78 (BP 1 Location: Left arm, BP Patient Position: Sitting, BP Cuff Size: Adult)   Pulse 79   Temp 97.5 °F (36.4 °C) (Temporal)   Resp 18   Ht 5' 2\" (1.575 m)   Wt 175 lb (79.4 kg)   SpO2 96%   BMI 32.01 kg/m²     1. Have you been to the ER, urgent care clinic since your last visit? Hospitalized since your last visit? No    2. Have you seen or consulted any other health care providers outside of the [address] [place] [address] since your last visit? Include any pap smears or colon screening.  No    Reviewed record in preparation for visit and have necessary documentation  Pt did not bring medication to office visit for review  opportunity was given for questions  Goals that were addressed and/or need to be completed during or after this appointment include   Health Maintenance Due   Topic Date Due    COVID-19 Vaccine (1) Never done    Shingrix Vaccine Age 50> (1 of 2) Never done    Bone Densitometry (Dexa) Screening  Never done    GLAUCOMA SCREENING Q2Y  08/20/2017    Medicare Yearly Exam  02/24/2021    Lipid Screen  02/24/2021

## 2021-03-17 NOTE — PROGRESS NOTES
[facility]    History of Present Illness:   Dottie Horn is a 80 y.o. female with history of HTN, GERD, Right hemiparesis,   CC: Head injury and Wrist injury. History provided by patient and Records    HPI:  History provided by family, patient had a fall in the bathroom in the morning. Patient struck head on counter on way down and may have landed on right arm as well. Patient had swelling on right forehead that has improved since this morning. Now noting pain in the right arm above the elbow with bruising on the right arm as well. Noting some point tenderness. Health Maintenance  Health Maintenance Due   Topic Date Due    Shingrix Vaccine Age 49> (1 of 2) Never done    Bone Densitometry (Dexa) Screening  Never done    Medicare Yearly Exam  02/24/2021    Lipid Screen  02/24/2021     3 most recent PHQ Screens 3/17/2021   Little interest or pleasure in doing things Not at all   Feeling down, depressed, irritable, or hopeless Not at all   Total Score PHQ 2 0       Past Medical, Family, and Social History:     Current Outpatient Medications on File Prior to Visit   Medication Sig Dispense Refill    pantoprazole (PROTONIX) 40 mg tablet Take 1 tablet by mouth once daily 90 Tab 1    amLODIPine (NORVASC) 10 mg tablet Take 1 Tab by mouth daily. 90 Tab 1    atorvastatin (LIPITOR) 40 mg tablet Take 1 Tab by mouth daily. 90 Tab 1    carvediloL (COREG) 6.25 mg tablet TAKE 1 TABLET BY MOUTH TWICE DAILY WITH MEALS 180 Tab 1    ascorbic acid (VITAMIN C) 250 mg tablet Take 1 Tab by mouth two (2) times daily (with meals). 60 Tab 3    aspirin 81 mg chewable tablet Take 1 Tab by mouth daily. 100 Tab 3    ferrous sulfate 325 mg (65 mg iron) tablet Take 1 Tab by mouth two (2) times daily (with meals). 60 Tab 3    therapeutic multivitamin (THERAGRAN) tablet Take 1 Tab by mouth daily.       acetaminophen (TYLENOL ARTHRITIS PAIN) 650 mg CR tablet Take 650 mg by mouth every six (6) hours as needed for Pain. No current facility-administered medications on file prior to visit.         Patient Active Problem List   Diagnosis Code    GERD (gastroesophageal reflux disease) K21.9    Hypertension I10    Arthritis M19.90    GI bleed K92.2    Symptomatic anemia D64.9    Right hemiparesis (HCC) G81.91    On statin therapy Z79.899    Antiplatelet or antithrombotic long-term use Z79.02    Late onset Alzheimer's disease without behavioral disturbance (HCC) G30.1, F02.80    Stage 3 chronic kidney disease N18.30       Social History     Socioeconomic History    Marital status:      Spouse name: Not on file    Number of children: Not on file    Years of education: Not on file    Highest education level: Not on file   Occupational History    Not on file   Social Needs    Financial resource strain: Not on file    Food insecurity     Worry: Not on file     Inability: Not on file    Transportation needs     Medical: Not on file     Non-medical: Not on file   Tobacco Use    Smoking status: Never Smoker    Smokeless tobacco: Never Used   Substance and Sexual Activity    Alcohol use: No    Drug use: No    Sexual activity: Not Currently     Partners: Male     Comment:    Lifestyle    Physical activity     Days per week: Not on file     Minutes per session: Not on file    Stress: Not on file   Relationships    Social connections     Talks on phone: Not on file     Gets together: Not on file     Attends Hinduism service: Not on file     Active member of club or organization: Not on file     Attends meetings of clubs or organizations: Not on file     Relationship status: Not on file    Intimate partner violence     Fear of current or ex partner: Not on file     Emotionally abused: Not on file     Physically abused: Not on file     Forced sexual activity: Not on file   Other Topics Concern    Not on file   Social History Narrative    Not on file       Review of Systems   Review of Systems   Constitutional: Negative for chills and fever. Respiratory: Negative for cough. Gastrointestinal: Negative for nausea and vomiting. Musculoskeletal: Positive for joint pain. Objective:     Visit Vitals  /78 (BP 1 Location: Left arm, BP Patient Position: Sitting, BP Cuff Size: Adult)   Pulse 79   Temp 97.5 °F (36.4 °C) (Temporal)   Resp 18   Ht 5' 2\" (1.575 m)   Wt 175 lb (79.4 kg)   SpO2 96%   BMI 32.01 kg/m²        Physical Exam  Vitals signs and nursing note reviewed. Constitutional:       Appearance: Normal appearance. HENT:      Head: Normocephalic. Comments: Swelling above the right eyebrow. Neck:      Musculoskeletal: Normal range of motion and neck supple. Cardiovascular:      Rate and Rhythm: Normal rate and regular rhythm. Pulses: Normal pulses. Heart sounds: Normal heart sounds. No murmur. No friction rub. No gallop. Pulmonary:      Effort: Pulmonary effort is normal.      Breath sounds: Normal breath sounds. Abdominal:      General: Abdomen is flat. Bowel sounds are normal.      Palpations: Abdomen is soft. Musculoskeletal:      Comments: Bruising on the right arm above the elbow. Mild point tenderness present. Neurological:      General: No focal deficit present. Mental Status: She is alert. Mental status is at baseline. Cranial Nerves: No cranial nerve deficit. Sensory: No sensory deficit. Pertinent Labs/Studies:      Assessment and orders:       ICD-10-CM ICD-9-CM    1. Injury of head, initial encounter  S09.90XA 959.01 CANCELED: XR WRIST RT AP/LAT   2. Fall from ground level  W18.30XA E888.9 CANCELED: XR WRIST RT AP/LAT   3. Chronic pain of right knee  M25.561 719.46 diclofenac (VOLTAREN) 1 % gel    G89.29 338.29    4. Contusion of right upper arm, initial encounter  S40.021A 923.03    5.  Injury of right wrist, initial encounter  S69.91XA 959.3 CANCELED: XR WRIST RT AP/LAT     Diagnoses and all orders for this visit: 1. Injury of head, initial encounter/Fall from ground level: Appears stable, no significant chage from baseline and improving. Strict ER Precautions discussed. 2. Chronic pain of right knee: Trial of VOltaren  -     diclofenac (VOLTAREN) 1 % gel; Apply 1 g to affected area four (4) times daily. 3. Contusion of right upper arm, initial encounter: Ice and Voltaren    4. Injury of right wrist, initial encounter: Old state, no significant change. Follow-up and Dispositions    · Return if symptoms worsen or fail to improve. I have discussed the diagnosis with the patient and the intended plan as seen in the above orders. Social history, medical history, and labs were reviewed. The patient has received an after-visit summary and questions were answered concerning future plans. I have discussed medication side effects and warnings with the patient as well.     MD SOHA Rodriguez & TYRELL ACE [facility]  03/17/21

## 2021-03-18 ENCOUNTER — TELEPHONE (OUTPATIENT)
Dept: FAMILY MEDICINE CLINIC | Age: 86
End: 2021-03-18

## 2021-03-18 NOTE — TELEPHONE ENCOUNTER
----- Message from Windsor Cooks sent at 3/17/2021  5:35 PM EDT -----  Regarding: Dr Riley Roper first and last name:Giulia Reed pts grand daughter       Reason for call:said her grandmother Yousuf Salinas   (p) [phone] said her voltaren cream rx  needs a prior [facility]   pharmacy requesting a call       Callback required yes/no and why:yes for reason given above to confirm prior auth was sent       Best contact number(s):[phone]      Details to clarify the request:      Windsor Cooks

## 2021-03-23 NOTE — TELEPHONE ENCOUNTER
Returned call to pt. Pt's , on [place], made aware that the cream is over the counter so insurance will not cover it. Mr Roby Lechuga stated they did get the cream over the counter and she has been using it.

## 2021-04-19 DIAGNOSIS — I10 ESSENTIAL HYPERTENSION: ICD-10-CM

## 2021-04-20 RX ORDER — CARVEDILOL 6.25 MG/1
TABLET ORAL
Qty: 180 TAB | Refills: 1 | Status: SHIPPED | OUTPATIENT
Start: 2021-04-20 | End: 2021-10-20 | Stop reason: SDUPTHER

## 2021-04-26 ENCOUNTER — OFFICE VISIT (OUTPATIENT)
Dept: FAMILY MEDICINE CLINIC | Age: 86
End: 2021-04-26
Payer: MEDICARE

## 2021-04-26 VITALS
HEIGHT: 62 IN | SYSTOLIC BLOOD PRESSURE: 123 MMHG | BODY MASS INDEX: 33.13 KG/M2 | WEIGHT: 180 LBS | OXYGEN SATURATION: 96 % | HEART RATE: 78 BPM | DIASTOLIC BLOOD PRESSURE: 67 MMHG | TEMPERATURE: 97.8 F | RESPIRATION RATE: 20 BRPM

## 2021-04-26 DIAGNOSIS — Z00.00 MEDICARE ANNUAL WELLNESS VISIT, SUBSEQUENT: Primary | ICD-10-CM
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
## 2021-04-26 DIAGNOSIS — G30.1 LATE ONSET ALZHEIMER'S DISEASE WITHOUT BEHAVIORAL DISTURBANCE (HCC): ICD-10-CM

## 2021-04-26 DIAGNOSIS — Z23 ENCOUNTER FOR IMMUNIZATION: ICD-10-CM

## 2021-04-26 DIAGNOSIS — G81.91 RIGHT HEMIPARESIS (HCC): ICD-10-CM

## 2021-04-26 DIAGNOSIS — I10 ESSENTIAL HYPERTENSION: ICD-10-CM

## 2021-04-26 DIAGNOSIS — F02.80 LATE ONSET ALZHEIMER'S DISEASE WITHOUT BEHAVIORAL DISTURBANCE (HCC): ICD-10-CM

## 2021-04-26 PROCEDURE — G8536 NO DOC ELDER MAL SCRN: HCPCS | Performed by: FAMILY MEDICINE

## 2021-04-26 PROCEDURE — G8427 DOCREV CUR MEDS BY ELIG CLIN: HCPCS | Performed by: FAMILY MEDICINE

## 2021-04-26 PROCEDURE — 1101F PT FALLS ASSESS-DOCD LE1/YR: CPT | Performed by: FAMILY MEDICINE

## 2021-04-26 PROCEDURE — 1090F PRES/ABSN URINE INCON ASSESS: CPT | Performed by: FAMILY MEDICINE

## 2021-04-26 PROCEDURE — G8417 CALC BMI ABV UP PARAM F/U: HCPCS | Performed by: FAMILY MEDICINE

## 2021-04-26 PROCEDURE — G0439 PPPS, SUBSEQ VISIT: HCPCS | Performed by: FAMILY MEDICINE

## 2021-04-26 PROCEDURE — 99214 OFFICE O/P EST MOD 30 MIN: CPT | Performed by: FAMILY MEDICINE

## 2021-04-26 PROCEDURE — G8510 SCR DEP NEG, NO PLAN REQD: HCPCS | Performed by: FAMILY MEDICINE

## 2021-04-26 RX ORDER — ZOSTER VACCINE RECOMBINANT, ADJUVANTED 50 MCG/0.5
0.5 KIT INTRAMUSCULAR ONCE
Qty: 0.5 ML | Refills: 1 | Status: SHIPPED | OUTPATIENT
Start: 2021-04-26 | End: 2021-04-26

## 2021-04-26 NOTE — PROGRESS NOTES
CC: f/u HTN and fall    HPI: Pt is a 80 y.o. female who presents for f/u HTN and fall. She has fallen twice in the bathroom over the past few months. One time was at night and her  thinks she woke up scared from a dream and went to the bathroom by herself. Generally he helps her get into the bathroom and onto the toilet but he was asleep and did not realize she had gotten up. Another time she was with a family member who was helping her and the family member was able to catch her so that she didn't fall all the way to the ground. They do have grab bars in the bathroom and she uses a walker as well. HTN:  Checking BPs at home?: NO  Adding salt to foods?: YES  Headaches?: NO  Blurry vision?: YES, chronic. She has cataracts and does not want to have them operated on  Lower extremity edema?: YES - she stays in the same seated position for most of the day. Smoking?: NO        Past Medical History:   Diagnosis Date    Anemia     Arthritis     Cerebral thrombosis with cerebral infarction [address] [address] 6/16/2015    Dementia ([facility] [address]     GERD (gastroesophageal reflux disease)     HLD (hyperlipidemia)     Hypertension     Stroke [facility])     Thyroid disease     thinks had partial resection?  Not on meds and normal TSH 10/2016       Family History   Problem Relation Age of Onset    Cancer Mother         colon   [facility] Mother     Cancer Father         bladder    Cancer Sister         not sure what kind    Cancer Brother         not sure what kind    Heart Disease Maternal Grandmother     Stroke Other         at young age       Social History     Tobacco Use    Smoking status: Never Smoker    Smokeless tobacco: Never Used   Substance Use Topics    Alcohol use: No    Drug use: No       ROS:  Per HPI    PE:  Visit Vitals  /67   Pulse 78   Temp 97.8 °F (36.6 °C) (Oral)   Resp 20   Ht 5' 2\" (1.575 m)   Wt 180 lb (81.6 kg)   SpO2 96%   BMI 32.92 kg/m²     Gen: Pt sitting in chair, in NAD  Head: Normocephalic, atraumatic  Eyes: Sclera anicteric, EOM grossly intact, PERRL  Throat: Mask in place  Neck: Supple, no LAD, no thyromegaly or carotid bruits  CVS: Normal S1, S2, no m/r/g  Resp: CTAB, no wheezes or rales  Extrem: Atraumatic, no cyanosis. 2+ edema to shins b/l  Pulses: 2+   Skin: Warm, dry  Neuro: Alert, appropriate      A/P:   Encounter Diagnoses     ICD-10-CM ICD-9-CM   1. Medicare annual wellness visit, subsequent  Z00.00 V70.0   2. Essential hypertension  I10 401.9   3. Fall, subsequent encounter  W19. XXXD V58.89     E888.9   4. Right hemiparesis (HCC)  G81.91 342.90   5. Late onset Alzheimer's disease without behavioral disturbance (HCC)  G30.1 331.0    F02.80 294.10   6. Encounter for immunization  Z23 V03.89     1. Medicare annual wellness visit, subsequent: See other note from today    2. Essential hypertension: Stable, continue carvedilol and amlodipine.   - LIPID PANEL; Future  - METABOLIC PANEL, COMPREHENSIVE; Future    3. Fall, subsequent encounter: Pt has recovered from these injuries. It does not seem like there is anything else they can do in the house to prevent this from happening again, and will just have to remain vigilant with the plan of having someone assist her in the bathroom. 4. Right hemiparesis ([address]): Chronic s/p stroke. 5. Late onset Alzheimer's disease without behavioral disturbance [facility]): Slowly progressing. She is not as active as she used to be and her sleep schedule is different than before. When asked, the pt states these things don't bother her and seem to be more bothersome to her spouse. Discussed natural course of Alzheimer's and that things like this are to be expected. Encouraged her to try and stay active to prevent increasing debility, but regarding her sleep, she should be allowed to follow her own schedule as long as she is safe.      6. Encounter for immunization  - varicella-zoster recombinant, PF, (Shingrix, PF,) 50 mcg/0.5 mL susr injection; 0.5 mL by IntraMUSCular route once for 1 dose. Dispense: 0.5 mL; Refill: 1    7. Lower extremity edema: Likely 2/2 chronic dependent status of legs. Discussed propping on cough or in recliner and also importance of compression stockings. She does use those sometimes and they seem to help. RTC in 6 months for f/u chronic conditions, or sooner prn      Discussed diagnoses in detail with patient. Medication risks/benefits/side effects discussed with patient. All of the patient's questions were addressed. The patient understands and agrees with our plan of care. The patient knows to call back if they are unsure of or forget any changes we discussed today or if the symptoms change. The patient received an After-Visit Summary which contains VS, orders, medication list and allergy list. This can be used as a \"mini-medical record\" should they have to seek medical care while out of town. Current Outpatient Medications on File Prior to Visit   Medication Sig Dispense Refill    carvediloL (COREG) 6.25 mg tablet TAKE 1 TABLET BY MOUTH TWICE DAILY WITH MEALS 180 Tab 1    diclofenac (VOLTAREN) 1 % gel Apply 1 g to affected area four (4) times daily. 150 g 1    pantoprazole (PROTONIX) 40 mg tablet Take 1 tablet by mouth once daily 90 Tab 1    amLODIPine (NORVASC) 10 mg tablet Take 1 Tab by mouth daily. 90 Tab 1    atorvastatin (LIPITOR) 40 mg tablet Take 1 Tab by mouth daily. 90 Tab 1    ascorbic acid (VITAMIN C) 250 mg tablet Take 1 Tab by mouth two (2) times daily (with meals). 60 Tab 3    aspirin 81 mg chewable tablet Take 1 Tab by mouth daily. 100 Tab 3    ferrous sulfate 325 mg (65 mg iron) tablet Take 1 Tab by mouth two (2) times daily (with meals). 60 Tab 3    therapeutic multivitamin (THERAGRAN) tablet Take 1 Tab by mouth daily.  acetaminophen (TYLENOL ARTHRITIS PAIN) 650 mg CR tablet Take 650 mg by mouth every six (6) hours as needed for Pain.        No current facility-administered medications on file prior to visit.

## 2021-04-26 NOTE — PROGRESS NOTES
This is the Subsequent Medicare Annual Wellness Exam, performed 12 months or more after the Initial AWV or the last Subsequent AWV    I have reviewed the patient's medical history in detail and updated the computerized patient record. Assessment/Plan   Education and counseling provided:  Are appropriate based on today's review and evaluation  Bone mass measurement (DEXA) - pt declines  Shingrix    1. Medicare annual wellness visit, subsequent       Depression Risk Factor Screening     3 most recent PHQ Screens 4/26/2021   Little interest or pleasure in doing things Not at all   Feeling down, depressed, irritable, or hopeless Not at all   Total Score PHQ 2 0       Alcohol Risk Screen    Do you average more than 1 drink per night or more than 7 drinks a week:  No    On any one occasion in the past three months have you have had more than 3 drinks containing alcohol:  No        Functional Ability and Level of Safety    Hearing: Hearing is good. Activities of Daily Living: The home contains: grab bars  Patient needs help with:  phone, transportation, shopping, preparing meals, laundry, housework, managing medications, managing money, dressing, bathing, hygiene and walking      Ambulation: with difficulty, uses a walker and wheelchair for when she goes out     Fall Risk:  Fall Risk Assessment, last 12 mths 4/26/2021   Able to walk? Yes   Fall in past 12 months? 1   Do you feel unsteady? 0   Are you worried about falling 0   Number of falls in past 12 months 1   Fall with injury?  1      Abuse Screen:  Patient is not abused       Cognitive Screening    Has your family/caregiver stated any concerns about your memory: yes - pt has dementia       Health Maintenance Due     Health Maintenance Due   Topic Date Due    Shingrix Vaccine Age 50> (1 of 2) Never done    Bone Densitometry (Dexa) Screening  Never done    Lipid Screen  02/24/2021       Patient Care Team   Patient Care Team:  Madison Wong MD as PCP - General (Family Medicine)  Harpreet Chino MD as PCP - [address] [place], [place] [address]efraín Mckinney Provider  Obie Vallejo MD (Neurology)    History     Patient Active Problem List   Diagnosis Code    GERD (gastroesophageal reflux disease) K21.9    Hypertension I10    Arthritis M19.90    GI bleed K92.2    Symptomatic anemia D64.9    Right hemiparesis ([address] G81.91    On statin therapy Z79.899    Antiplatelet or antithrombotic long-term use Z79.02    Late onset Alzheimer's disease without behavioral disturbance (HCC) G30.1, F02.80    Stage 3 chronic kidney disease ([address] N18.30     Past Medical History:   Diagnosis Date    Anemia     Arthritis     Cerebral thrombosis with cerebral infarction ([facility] [address] 6/16/2015    Dementia ([facility] [address]     GERD (gastroesophageal reflux disease)     HLD (hyperlipidemia)     Hypertension     Stroke ([facility] [address]     Thyroid disease     thinks had partial resection? Not on meds and normal TSH 10/2016      Past Surgical History:   Procedure Laterality Date    HX CHOLECYSTECTOMY      HX HYSTERECTOMY      Can't remember why but is sure she never had cancer. Current Outpatient Medications   Medication Sig Dispense Refill    carvediloL (COREG) 6.25 mg tablet TAKE 1 TABLET BY MOUTH TWICE DAILY WITH MEALS 180 Tab 1    diclofenac (VOLTAREN) 1 % gel Apply 1 g to affected area four (4) times daily. 150 g 1    pantoprazole (PROTONIX) 40 mg tablet Take 1 tablet by mouth once daily 90 Tab 1    amLODIPine (NORVASC) 10 mg tablet Take 1 Tab by mouth daily. 90 Tab 1    atorvastatin (LIPITOR) 40 mg tablet Take 1 Tab by mouth daily. 90 Tab 1    ascorbic acid (VITAMIN C) 250 mg tablet Take 1 Tab by mouth two (2) times daily (with meals). 60 Tab 3    aspirin 81 mg chewable tablet Take 1 Tab by mouth daily. 100 Tab 3    ferrous sulfate 325 mg (65 mg iron) tablet Take 1 Tab by mouth two (2) times daily (with meals). 60 Tab 3    therapeutic multivitamin (THERAGRAN) tablet Take 1 Tab by mouth daily.       acetaminophen (TYLENOL ARTHRITIS PAIN) 650 mg CR tablet Take 650 mg by mouth every six (6) hours as needed for Pain.        No Known Allergies    Family History   Problem Relation Age of Onset    Cancer Mother         colon   [facility] Alzheimer Mother     Cancer Father         bladder    Cancer Sister         not sure what kind    Cancer Brother         not sure what kind    Heart Disease Maternal Grandmother     Stroke Other         at young age     Social History     Tobacco Use    Smoking status: Never Smoker    Smokeless tobacco: Never Used   Substance Use Topics    Alcohol use: No         Gordo Peace MD

## 2021-04-26 NOTE — PATIENT INSTRUCTIONS
Medicare Wellness Visit, Female The best way to live healthy is to have a lifestyle where you eat a well-balanced diet, exercise regularly, limit alcohol use, and quit all forms of tobacco/nicotine, if applicable. Regular preventive services are another way to keep healthy. Preventive services (vaccines, screening tests, monitoring & exams) can help personalize your care plan, which helps you manage your own care. Screening tests can find health problems at the earliest stages, when they are easiest to treat. [facility] follows the current, evidence-based guidelines published by the [facility] ([facility]) when recommending preventive services for our patients. Because we follow these guidelines, sometimes recommendations change over time as research supports it. (For example, mammograms used to be recommended annually. Even though Medicare will still pay for an annual mammogram, the newer guidelines recommend a mammogram every two years for women of average risk). Of course, you and your doctor may decide to screen more often for some diseases, based on your risk and your co-morbidities (chronic disease you are already diagnosed with). Preventive services for you include: - Medicare offers their members a free annual wellness visit, which is time for you and your primary care provider to discuss and plan for your preventive service needs. Take advantage of this benefit every year! 
-All adults over the age of 72 should receive the recommended pneumonia vaccines. Current USPSTF guidelines recommend a series of two vaccines for the best pneumonia protection.  
-All adults should have a flu vaccine yearly and a tetanus vaccine every 10 years.  
-All adults age 48 and older should receive the shingles vaccines (series of two vaccines).      
-All adults age 38-68 who are overweight should have a diabetes screening test once every three years.  
-All adults born between [birth year] and 1965 should be screened once for Hepatitis C. 
-Other screening tests and preventive services for persons with diabetes include: an eye exam to screen for diabetic retinopathy, a kidney function test, a foot exam, and stricter control over your cholesterol.  
-Cardiovascular screening for adults with routine risk involves an electrocardiogram (ECG) at intervals determined by your doctor.  
-Colorectal cancer screenings should be done for adults age 54-65 with no increased risk factors for colorectal cancer. There are a number of acceptable methods of screening for this type of cancer. Each test has its own benefits and drawbacks. Discuss with your doctor what is most appropriate for you during your annual wellness visit. The different tests include: colonoscopy (considered the best screening method), a fecal occult blood test, a fecal DNA test, and sigmoidoscopy. 
 
-A bone mass density test is recommended when a woman turns 65 to screen for osteoporosis. This test is only recommended one time, as a screening. Some providers will use this same test as a disease monitoring tool if you already have osteoporosis. -Breast cancer screenings are recommended every other year for women of normal risk, age 54-69. 
-Cervical cancer screenings for women over age 72 are only recommended with certain risk factors. Here is a list of your current Health Maintenance items (your personalized list of preventive services) with a due date: 
Health Maintenance Due Topic Date Due  Shingles Vaccine (1 of 2) Never done  Bone Mineral Density   Never done  Cholesterol Test   02/24/2021

## 2021-04-26 NOTE — PROGRESS NOTES
1. Have you been to the ER, urgent care clinic since your last visit? Hospitalized since your last visit? No    2. Have you seen or consulted any other health care providers outside of the [address] [place] [address] since your last visit? Include any pap smears or colon screening.  No  Reviewed record in preparation for visit and have necessary documentation  Pt did not bring medication to office visit for review    Goals that were addressed and/or need to be completed during or after this appointment include   Health Maintenance Due   Topic Date Due    Shingrix Vaccine Age 50> (1 of 2) Never done    Bone Densitometry (Dexa) Screening  Never done    Medicare Yearly Exam  02/24/2021    Lipid Screen  02/24/2021

## 2021-05-01 LAB
ALBUMIN SERPL-MCNC: 3.7 G/DL (ref 3.5–5)
ALBUMIN/GLOB SERPL: 1.1 {RATIO} (ref 1.1–2.2)
ALP SERPL-CCNC: 180 U/L (ref 45–117)
ALT SERPL-CCNC: 34 U/L (ref 12–78)
ANION GAP SERPL CALC-SCNC: 13 MMOL/L (ref 5–15)
AST SERPL-CCNC: 34 U/L (ref 15–37)
BILIRUB SERPL-MCNC: 1.2 MG/DL (ref 0.2–1)
BUN SERPL-MCNC: 18 MG/DL (ref 6–20)
BUN/CREAT SERPL: 15 (ref 12–20)
CALCIUM SERPL-MCNC: 9 MG/DL (ref 8.5–10.1)
CHLORIDE SERPL-SCNC: 106 MMOL/L (ref 97–108)
CHOLEST SERPL-MCNC: 115 MG/DL
CO2 SERPL-SCNC: 24 MMOL/L (ref 21–32)
CREAT SERPL-MCNC: 1.22 MG/DL (ref 0.55–1.02)
GLOBULIN SER CALC-MCNC: 3.3 G/DL (ref 2–4)
GLUCOSE SERPL-MCNC: 140 MG/DL (ref 65–100)
HDLC SERPL-MCNC: 44 MG/DL
HDLC SERPL: 2.6 {RATIO} (ref 0–5)
LDLC SERPL CALC-MCNC: 35.8 MG/DL (ref 0–100)
LIPID PROFILE,FLP: ABNORMAL
POTASSIUM SERPL-SCNC: 3.8 MMOL/L (ref 3.5–5.1)
PROT SERPL-MCNC: 7 G/DL (ref 6.4–8.2)
SODIUM SERPL-SCNC: 143 MMOL/L (ref 136–145)
TRIGL SERPL-MCNC: 176 MG/DL (ref ?–150)
VLDLC SERPL CALC-MCNC: 35.2 MG/DL

## 2021-05-10 DIAGNOSIS — E78.2 MIXED HYPERLIPIDEMIA: ICD-10-CM

## 2021-05-10 DIAGNOSIS — I10 ESSENTIAL HYPERTENSION: ICD-10-CM

## 2021-05-10 RX ORDER — AMLODIPINE BESYLATE 10 MG/1
10 TABLET ORAL DAILY
Qty: 90 TAB | Refills: 1 | Status: SHIPPED | OUTPATIENT
Start: 2021-05-10 | End: 2021-11-04 | Stop reason: SDUPTHER

## 2021-05-10 RX ORDER — ATORVASTATIN CALCIUM 40 MG/1
40 TABLET, FILM COATED ORAL DAILY
Qty: 90 TAB | Refills: 1 | Status: SHIPPED | OUTPATIENT
Start: 2021-05-10 | End: 2021-11-15 | Stop reason: SDUPTHER

## 2021-07-01 DIAGNOSIS — K92.1 GASTROINTESTINAL HEMORRHAGE WITH MELENA: ICD-10-CM

## 2021-07-01 DIAGNOSIS — K44.9 HIATAL HERNIA: ICD-10-CM

## 2021-07-01 NOTE — TELEPHONE ENCOUNTER
----- Message from Jadon Thacker sent at 7/1/2021  9:24 AM EDT -----  Regarding: Dr. Aliyah Shah  Medication Refill    Caller (if not patient): Mr. Jessee Ruiz      Relationship of caller (if not patient):       Best contact number(s): [phone]      Name of medication and dosage if known: pantoprazole 40 mg      Is patient out of this medication (yes/no): yes      Pharmacy name:  [facility] listed in chart? (yes/no): yes  Pharmacy phone number: [phone]      Details to clarify the request: [address]

## 2021-07-02 RX ORDER — PANTOPRAZOLE SODIUM 40 MG/1
TABLET, DELAYED RELEASE ORAL
Qty: 90 TABLET | Refills: 1 | Status: SHIPPED | OUTPATIENT
Start: 2021-07-02 | End: 2021-12-21 | Stop reason: SDUPTHER

## 2021-10-20 DIAGNOSIS — I10 ESSENTIAL HYPERTENSION: ICD-10-CM

## 2021-10-20 RX ORDER — CARVEDILOL 6.25 MG/1
TABLET ORAL
Qty: 180 TABLET | Refills: 1 | Status: SHIPPED | OUTPATIENT
Start: 2021-10-20 | End: 2022-04-25 | Stop reason: SDUPTHER

## 2021-10-20 NOTE — TELEPHONE ENCOUNTER
----- Message from Pattie Franco sent at 10/20/2021 10:14 AM EDT -----  Subject: Refill Request    QUESTIONS  Name of Medication? carvediloL (COREG) 6.25 mg tablet  Patient-reported dosage and instructions? 6.25 mg tab  How many days do you have left? 8  Preferred Pharmacy? [facility]  Pharmacy phone number (if available)? [phone]  ---------------------------------------------------------------------------  --------------  Tonya VALENCIA  What is the best way for the office to contact you? OK to leave message on   voicemail  Preferred Call Back Phone Number?  [phone]

## 2021-10-28 ENCOUNTER — OFFICE VISIT (OUTPATIENT)
Dept: FAMILY MEDICINE CLINIC | Age: 86
End: 2021-10-28
Payer: MEDICARE

## 2021-10-28 VITALS
RESPIRATION RATE: 24 BRPM | OXYGEN SATURATION: 92 % | HEART RATE: 76 BPM | TEMPERATURE: 97.3 F | HEIGHT: 62 IN | DIASTOLIC BLOOD PRESSURE: 79 MMHG | WEIGHT: 183 LBS | SYSTOLIC BLOOD PRESSURE: 119 MMHG | BODY MASS INDEX: 33.68 KG/M2

## 2021-10-28 DIAGNOSIS — I10 PRIMARY HYPERTENSION: ICD-10-CM

## 2021-10-28 DIAGNOSIS — G30.1 LATE ONSET ALZHEIMER'S DISEASE WITHOUT BEHAVIORAL DISTURBANCE (HCC): ICD-10-CM

## 2021-10-28 DIAGNOSIS — K21.9 GASTROESOPHAGEAL REFLUX DISEASE, UNSPECIFIED WHETHER ESOPHAGITIS PRESENT: Primary | ICD-10-CM

## 2021-10-28 DIAGNOSIS — F02.80 LATE ONSET ALZHEIMER'S DISEASE WITHOUT BEHAVIORAL DISTURBANCE (HCC): ICD-10-CM

## 2021-10-28 DIAGNOSIS — R73.01 IMPAIRED FASTING GLUCOSE: ICD-10-CM

## 2021-10-28 DIAGNOSIS — N18.31 STAGE 3A CHRONIC KIDNEY DISEASE (HCC): ICD-10-CM

## 2021-10-28 LAB
ANION GAP SERPL CALC-SCNC: 6 MMOL/L (ref 5–15)
BUN SERPL-MCNC: 13 MG/DL (ref 6–20)
BUN/CREAT SERPL: 9 (ref 12–20)
CALCIUM SERPL-MCNC: 9.9 MG/DL (ref 8.5–10.1)
CHLORIDE SERPL-SCNC: 105 MMOL/L (ref 97–108)
CHOLEST SERPL-MCNC: 123 MG/DL
CO2 SERPL-SCNC: 26 MMOL/L (ref 21–32)
CREAT SERPL-MCNC: 1.44 MG/DL (ref 0.55–1.02)
EST. AVERAGE GLUCOSE BLD GHB EST-MCNC: 108 MG/DL
GLUCOSE SERPL-MCNC: 167 MG/DL (ref 65–100)
HBA1C MFR BLD: 5.4 % (ref 4–5.6)
HDLC SERPL-MCNC: 47 MG/DL
HDLC SERPL: 2.6 {RATIO} (ref 0–5)
LDLC SERPL CALC-MCNC: 30.8 MG/DL (ref 0–100)
POTASSIUM SERPL-SCNC: 4 MMOL/L (ref 3.5–5.1)
SODIUM SERPL-SCNC: 137 MMOL/L (ref 136–145)
TRIGL SERPL-MCNC: 226 MG/DL (ref ?–150)
VLDLC SERPL CALC-MCNC: 45.2 MG/DL

## 2021-10-28 PROCEDURE — 99214 OFFICE O/P EST MOD 30 MIN: CPT | Performed by: FAMILY MEDICINE

## 2021-10-28 PROCEDURE — 1090F PRES/ABSN URINE INCON ASSESS: CPT | Performed by: FAMILY MEDICINE

## 2021-10-28 PROCEDURE — G8417 CALC BMI ABV UP PARAM F/U: HCPCS | Performed by: FAMILY MEDICINE

## 2021-10-28 PROCEDURE — G8427 DOCREV CUR MEDS BY ELIG CLIN: HCPCS | Performed by: FAMILY MEDICINE

## 2021-10-28 PROCEDURE — 1101F PT FALLS ASSESS-DOCD LE1/YR: CPT | Performed by: FAMILY MEDICINE

## 2021-10-28 PROCEDURE — G8510 SCR DEP NEG, NO PLAN REQD: HCPCS | Performed by: FAMILY MEDICINE

## 2021-10-28 PROCEDURE — G8536 NO DOC ELDER MAL SCRN: HCPCS | Performed by: FAMILY MEDICINE

## 2021-10-28 NOTE — PATIENT INSTRUCTIONS
A Healthy Lifestyle: Care Instructions  Your Care Instructions     A healthy lifestyle can help you feel good, stay at a healthy weight, and have plenty of energy for both work and play. A healthy lifestyle is something you can share with your whole family. A healthy lifestyle also can lower your risk for serious health problems, such as high blood pressure, heart disease, and diabetes. You can follow a few steps listed below to improve your health and the health of your family. Follow-up care is a key part of your treatment and safety. Be sure to make and go to all appointments, and call your doctor if you are having problems. It's also a good idea to know your test results and keep a list of the medicines you take. How can you care for yourself at home? · Do not eat too much sugar, fat, or fast foods. You can still have dessert and treats now and then. The goal is moderation. · Start small to improve your eating habits. Pay attention to portion sizes, drink less juice and soda pop, and eat more fruits and vegetables. ? Eat a healthy amount of food. A 3-ounce serving of meat, for example, is about the size of a deck of cards. Fill the rest of your plate with vegetables and whole grains. ? Limit the amount of soda and sports drinks you have every day. Drink more water when you are thirsty. ? Eat plenty of fruits and vegetables every day. Have an apple or some carrot sticks as an afternoon snack instead of a candy bar. Try to have fruits and/or vegetables at every meal.  · Make exercise part of your daily routine. You may want to start with simple activities, such as walking, bicycling, or slow swimming. Try to be active 30 to 60 minutes every day. You do not need to do all 30 to 60 minutes all at once. For example, you can exercise 3 times a day for 10 or 20 minutes.  Moderate exercise is safe for most people, but it is always a good idea to talk to your doctor before starting an exercise program.  · Keep moving. [employer] the lawn, work in the garden, or [place]. Take the stairs instead of the elevator at work. · If you smoke, quit. People who smoke have an increased risk for heart attack, stroke, cancer, and other lung illnesses. Quitting is hard, but there are ways to boost your chance of quitting tobacco for good. ? Use nicotine gum, patches, or lozenges. ? Ask your doctor about stop-smoking programs and medicines. ? Keep trying. In addition to reducing your risk of diseases in the future, you will notice some benefits soon after you stop using tobacco. If you have shortness of breath or asthma symptoms, they will likely get better within a few weeks after you quit. · Limit how much alcohol you drink. Moderate amounts of alcohol (up to 2 drinks a day for men, 1 drink a day for women) are okay. But drinking too much can lead to liver problems, high blood pressure, and other health problems. Family health  If you have a family, there are many things you can do together to improve your health. · Eat meals together as a family as often as possible. · Eat healthy foods. This includes fruits, vegetables, lean meats and dairy, and whole grains. · Include your family in your fitness plan. Most people think of activities such as jogging or tennis as the way to fitness, but there are many ways you and your family can be more active. Anything that makes you breathe hard and gets your heart pumping is exercise. Here are some tips:  ? Walk to do errands or to take your child to school or the bus.  ? Go for a family bike ride after dinner instead of watching TV. Where can you learn more? Go to http://www.gray.com/  Enter P497 in the search box to learn more about \"A Healthy Lifestyle: Care Instructions. \"  Current as of: June 16, 2021               Content Version: 13.0  © 8011-2873 [employer].    Care instructions adapted under license by [employer] (which disclaims liability or warranty for this information). If you have questions about a medical condition or this instruction, always ask your healthcare professional. [address] any warranty or liability for your use of this information.

## 2021-10-28 NOTE — PROGRESS NOTES
CC: f/u HTN, HLD, dementia, and GERD    HPI: Pt is a 80 y.o. female who presents for f/u HTN, HLD, dementia and GERD. Her  reports that she is sleeping more and eating a lot of snacks but overall is stable and seems happy. Pt denies any complaints. HTN:  Checking BPs at home?: NO  Headaches?: NO  Blurry vision?: YES - has had this for a long time and has cataracts but has not wanted to get them operated on  Lower extremity edema?: YES, chronic, stable  Smoking?: NO      Past Medical History:   Diagnosis Date    Anemia     Arthritis     Cerebral thrombosis with cerebral infarction ([facility] 6/16/2015    Dementia ([facility]     GERD (gastroesophageal reflux disease)     HLD (hyperlipidemia)     Hypertension     Stroke ([facility]     Thyroid disease     thinks had partial resection? Not on meds and normal TSH 10/2016       Family History   Problem Relation Age of Onset    Cancer Mother         colon    Alzheimer Mother     Cancer Father         bladder    Cancer Sister         not sure what kind    Cancer Brother         not sure what kind    Heart Disease Maternal Grandmother     Stroke Other         at young age       Social History     Tobacco Use    Smoking status: Never Smoker    Smokeless tobacco: Never Used   Substance Use Topics    Alcohol use: No    Drug use: No       ROS:  Per HPI    PE:  Visit Vitals  /79   Pulse 76   Temp 97.3 °F (36.3 °C)   Resp 24   Ht 5' 2\" (1.575 m)   Wt 183 lb (83 kg)   SpO2 92%   BMI 33.47 kg/m²     Gen: Pt sitting in chair, in NAD  Head: Normocephalic, atraumatic  Eyes: Sclera anicteric, EOM grossly intact, PERRL  Nose: Normal nasal mucosa  Throat: MMM, normal lips, tongue and gums  Neck: Supple, no LAD, no thyromegaly or carotid bruits  CVS: Normal S1, S2, no m/r/g  Resp: CTAB, no wheezes or rales  Extrem: Atraumatic, no cyanosis.  2+ edema to ankles b/l  Pulses: 2+   Skin: Warm, dry  Neuro: Alert, oriented, appropriate      A/P:   Encounter Diagnoses ICD-10-CM ICD-9-CM   1. Gastroesophageal reflux disease, unspecified whether esophagitis present  K21.9 530.81   2. Primary hypertension  I10 401.9   3. Late onset Alzheimer's disease without behavioral disturbance (HCC)  G30.1 331.0    F02.80 294.10   4. Stage 3a chronic kidney disease (HCC)  N18.31 585.3   5. Impaired fasting glucose  R73.01 790.21     1. Gastroesophageal reflux disease, unspecified whether esophagitis present: Stable on PPI. Advised they could do a trial off to see if she still needs it. 2. Primary hypertension: Stable, continue current medications.  - LIPID PANEL; Future  - METABOLIC PANEL, BASIC; Future  - METABOLIC PANEL, BASIC  - LIPID PANEL    3. Late onset Alzheimer's disease without behavioral disturbance ([facility] [address]): Stable, discussed reasonable expectations with pt's  and handout provided on caregiver stress. He is not interested in looking into getting any help for the home. 4. Stage 3a chronic kidney disease ([facility] [address]): Stable, will check BMP    5. Impaired fasting glucose: Last A1c wnl, but given increase in snack foods and weight, will check again today  - HEMOGLOBIN A1C WITH EAG; Future  - HEMOGLOBIN A1C WITH EAG       RTC in 6 months for f/u chronic conditions, or sooner prn    Discussed diagnoses in detail with patient. Medication risks/benefits/side effects discussed with patient. All of the patient's questions were addressed. The patient understands and agrees with our plan of care. The patient knows to call back if they are unsure of or forget any changes we discussed today or if the symptoms change. The patient received an After-Visit Summary which contains VS, orders, medication list and allergy list. This can be used as a \"mini-medical record\" should they have to seek medical care while out of town.     Current Outpatient Medications on File Prior to Visit   Medication Sig Dispense Refill    carvediloL (COREG) 6.25 mg tablet TAKE 1 TABLET BY MOUTH TWICE DAILY WITH MEALS 180 Tablet 1    pantoprazole (PROTONIX) 40 mg tablet Take 1 tablet by mouth once daily 90 Tablet 1    amLODIPine (NORVASC) 10 mg tablet Take 1 Tab by mouth daily. 90 Tab 1    atorvastatin (LIPITOR) 40 mg tablet Take 1 Tab by mouth daily. 90 Tab 1    diclofenac (VOLTAREN) 1 % gel Apply 1 g to affected area four (4) times daily. 150 g 1    ascorbic acid (VITAMIN C) 250 mg tablet Take 1 Tab by mouth two (2) times daily (with meals). 60 Tab 3    aspirin 81 mg chewable tablet Take 1 Tab by mouth daily. 100 Tab 3    ferrous sulfate 325 mg (65 mg iron) tablet Take 1 Tab by mouth two (2) times daily (with meals). 60 Tab 3    therapeutic multivitamin (THERAGRAN) tablet Take 1 Tab by mouth daily.  acetaminophen (TYLENOL ARTHRITIS PAIN) 650 mg CR tablet Take 650 mg by mouth every six (6) hours as needed for Pain. No current facility-administered medications on file prior to visit.

## 2021-10-28 NOTE — PROGRESS NOTES
1. Have you been to the ER, urgent care clinic since your last visit? Hospitalized since your last visit? No    2. Have you seen or consulted any other health care providers outside of the [address] [place] [address] since your last visit? Include any pap smears or colon screening.  No  Reviewed record in preparation for visit and have necessary documentation  Pt did not bring medication to office visit for review    Goals that were addressed and/or need to be completed during or after this appointment include   Health Maintenance Due   Topic Date Due    Shingrix Vaccine Age 50> (1 of 2) Never done    Bone Densitometry (Dexa) Screening  Never done    Flu Vaccine (1) 09/01/2021

## 2021-10-29 ENCOUNTER — TELEPHONE (OUTPATIENT)
Dept: FAMILY MEDICINE CLINIC | Age: 86
End: 2021-10-29

## 2021-11-04 DIAGNOSIS — I10 ESSENTIAL HYPERTENSION: ICD-10-CM

## 2021-11-04 NOTE — TELEPHONE ENCOUNTER
----- Message from Chichi Shepherd sent at 11/4/2021  9:24 AM EDT -----  Subject: Refill Request    QUESTIONS  Name of Medication? amLODIPine (NORVASC) 10 mg tablet  Patient-reported dosage and instructions? Take 1 Tab by mouth daily. How many days do you have left? 6  Preferred Pharmacy? [facility]  Pharmacy phone number (if available)? [phone]  ---------------------------------------------------------------------------  --------------  William VALENCIA  What is the best way for the office to contact you? OK to leave message on   voicemail  Preferred Call Back Phone Number?  [phone]

## 2021-11-05 RX ORDER — AMLODIPINE BESYLATE 10 MG/1
10 TABLET ORAL DAILY
Qty: 90 TABLET | Refills: 1 | Status: SHIPPED | OUTPATIENT
Start: 2021-11-05 | End: 2022-05-09 | Stop reason: SDUPTHER

## 2021-11-15 DIAGNOSIS — E78.2 MIXED HYPERLIPIDEMIA: ICD-10-CM

## 2021-11-15 NOTE — TELEPHONE ENCOUNTER
----- Message from Leon Soulier sent at 11/13/2021  8:43 AM EST -----  Subject: Refill Request    QUESTIONS  Name of Medication? atorvastatin (LIPITOR) 40 mg tablet  Patient-reported dosage and instructions? once a day  How many days do you have left? 0  Preferred Pharmacy? [facility]  Pharmacy phone number (if available)? [phone]  ---------------------------------------------------------------------------  --------------  [employer]  What is the best way for the office to contact you? OK to leave message on   voicemail  Preferred Call Back Phone Number?  [phone]

## 2021-11-16 RX ORDER — ATORVASTATIN CALCIUM 40 MG/1
40 TABLET, FILM COATED ORAL DAILY
Qty: 90 TABLET | Refills: 1 | Status: SHIPPED | OUTPATIENT
Start: 2021-11-16 | End: 2022-02-07

## 2021-12-21 DIAGNOSIS — K92.1 GASTROINTESTINAL HEMORRHAGE WITH MELENA: ICD-10-CM

## 2021-12-21 DIAGNOSIS — K44.9 HIATAL HERNIA: ICD-10-CM

## 2021-12-21 RX ORDER — PANTOPRAZOLE SODIUM 40 MG/1
TABLET, DELAYED RELEASE ORAL
Qty: 90 TABLET | Refills: 1 | Status: SHIPPED | OUTPATIENT
Start: 2021-12-21 | End: 2022-07-01

## 2021-12-21 NOTE — TELEPHONE ENCOUNTER
----- Message from Rl Lou sent at 12/21/2021  3:16 PM EST -----  Subject: Refill Request    QUESTIONS  Name of Medication? pantoprazole (PROTONIX) 40 mg tablet  Patient-reported dosage and instructions? 40mg daily  How many days do you have left? 10  Preferred Pharmacy? [facility]  Pharmacy phone number (if available)? [phone]  ---------------------------------------------------------------------------  --------------  [place] Sanchez INFO  What is the best way for the office to contact you? OK to leave message on   voicemail  Preferred Call Back Phone Number?  [phone]

## 2022-02-07 DIAGNOSIS — E78.2 MIXED HYPERLIPIDEMIA: ICD-10-CM

## 2022-02-07 RX ORDER — ATORVASTATIN CALCIUM 40 MG/1
TABLET, FILM COATED ORAL
Qty: 90 TABLET | Refills: 0 | Status: SHIPPED | OUTPATIENT
Start: 2022-02-07 | End: 2022-05-09 | Stop reason: SDUPTHER

## 2022-03-19 PROBLEM — F02.80 LATE ONSET ALZHEIMER'S DISEASE WITHOUT BEHAVIORAL DISTURBANCE (HCC): Status: ACTIVE | Noted: 2018-02-19

## 2022-03-19 PROBLEM — G30.1 LATE ONSET ALZHEIMER'S DISEASE WITHOUT BEHAVIORAL DISTURBANCE (HCC): Status: ACTIVE | Noted: 2018-02-19

## 2022-03-20 PROBLEM — N18.30 STAGE 3 CHRONIC KIDNEY DISEASE (HCC): Status: ACTIVE | Noted: 2018-08-28

## 2022-04-22 ENCOUNTER — OFFICE VISIT (OUTPATIENT)
Dept: FAMILY MEDICINE CLINIC | Age: 87
End: 2022-04-22
Payer: MEDICARE

## 2022-04-22 VITALS
OXYGEN SATURATION: 96 % | SYSTOLIC BLOOD PRESSURE: 116 MMHG | BODY MASS INDEX: 33.31 KG/M2 | DIASTOLIC BLOOD PRESSURE: 74 MMHG | TEMPERATURE: 97.4 F | WEIGHT: 181 LBS | HEIGHT: 62 IN | HEART RATE: 84 BPM | RESPIRATION RATE: 20 BRPM

## 2022-04-22 DIAGNOSIS — F02.80 LATE ONSET ALZHEIMER'S DISEASE WITHOUT BEHAVIORAL DISTURBANCE (HCC): Primary | ICD-10-CM

## 2022-04-22 DIAGNOSIS — G30.1 LATE ONSET ALZHEIMER'S DISEASE WITHOUT BEHAVIORAL DISTURBANCE (HCC): Primary | ICD-10-CM

## 2022-04-22 DIAGNOSIS — R63.0 DECREASED APPETITE: ICD-10-CM

## 2022-04-22 PROCEDURE — G8510 SCR DEP NEG, NO PLAN REQD: HCPCS | Performed by: STUDENT IN AN ORGANIZED HEALTH CARE EDUCATION/TRAINING PROGRAM

## 2022-04-22 PROCEDURE — 99213 OFFICE O/P EST LOW 20 MIN: CPT | Performed by: STUDENT IN AN ORGANIZED HEALTH CARE EDUCATION/TRAINING PROGRAM

## 2022-04-22 PROCEDURE — 1090F PRES/ABSN URINE INCON ASSESS: CPT | Performed by: STUDENT IN AN ORGANIZED HEALTH CARE EDUCATION/TRAINING PROGRAM

## 2022-04-22 PROCEDURE — G8536 NO DOC ELDER MAL SCRN: HCPCS | Performed by: STUDENT IN AN ORGANIZED HEALTH CARE EDUCATION/TRAINING PROGRAM

## 2022-04-22 PROCEDURE — G8417 CALC BMI ABV UP PARAM F/U: HCPCS | Performed by: STUDENT IN AN ORGANIZED HEALTH CARE EDUCATION/TRAINING PROGRAM

## 2022-04-22 PROCEDURE — G8428 CUR MEDS NOT DOCUMENT: HCPCS | Performed by: STUDENT IN AN ORGANIZED HEALTH CARE EDUCATION/TRAINING PROGRAM

## 2022-04-22 PROCEDURE — 1101F PT FALLS ASSESS-DOCD LE1/YR: CPT | Performed by: STUDENT IN AN ORGANIZED HEALTH CARE EDUCATION/TRAINING PROGRAM

## 2022-04-22 NOTE — PROGRESS NOTES
1. Have you been to the ER, urgent care clinic since your last visit? Hospitalized since your last visit?    no  2. Have you seen or consulted any other health care providers outside of the [facility] since your last visit?  no      3. For patients aged 39-70: Has the patient had a colonoscopy / FIT/ Cologuard? If the patient is female:    4. For patients aged 41-77: Has the patient had a mammogram within the past 2 years? 5. For patients aged 21-65: Has the patient had a pap smear?        Opportunity was given for questions    Goals that were addressed and/or need to be completed during or after this appointment include   Health Maintenance Due   Topic Date Due    Shingrix Vaccine Age 49> (1 of 2) Never done    Bone Densitometry (Dexa) Screening  Never done    COVID-19 Vaccine (3 - Booster for Moderna series) 07/25/2021    Medicare Yearly Exam  04/27/2022

## 2022-04-22 NOTE — PROGRESS NOTES
Zana Dominguez (: 1935) is a 80 y.o. female, established patient, here for evaluation of the following chief complaint(s):  Weight Loss (x 1 week ago)       Assessment/Plan:  1. Late onset Alzheimer's disease without behavioral disturbance (HCC)-Per discussion with the patient's  and Dr. Gorge Tapia who is her normal PCP, she appears to be at her physical and mental baseline. As she has not had any weight loss or any other intra-abdominal symptoms so we will take a conservative approach to her decreased appetite. This may be a sign of her progressing Alzheimer's disease. Instructed to offer food frequently that is calorically dense but not stress the patient about eating. Briefly discussed that decreased appetite due to Alzheimer's is not distressing to the patient and reassuring that we can continue to safely monitor this. 2. Decreased appetite-as above    Return in about 2 months (around 2022). Subjective/Objective:  HPI  Decreased appetite- History provided mostly by pt's /caregiver.  states that this has been a recent change over the last 3-4 days. Pt has just not wanted to eat as much. She denies any abdominal pain, nausea, vomiting, diarrhea or constipation. Has been at her mental baseline. Stroke in 2015-  Increasingly over the last 2 years has relied on her  for most of her ADLs. Physical Exam  Blood pressure 116/74, pulse 84, temperature 97.4 °F (36.3 °C), temperature source Oral, resp. rate 20, height 5' 2\" (1.575 m), weight 181 lb (82.1 kg), SpO2 96 %. Body mass index is 33.11 kg/m². General appearance - Alert, NAD. Head - Atraumatic. Normocephalic. No lymphadenopathy  Eyes - EOMI. Sclera white. Ears - Hearing grossly normal.    Nose - Nares patent, no polyps  Throat - pharynx clear, no exudates. Respiratory - LCTAB. No wheeze/rale/rhonchi  Heart - Normal rate, regular rhythm. No m/r/r  Abdomen - Soft, non tender. Non distended. Neurological - No focal deficits. Speech normal.   Musculoskeletal - Normal ROM, Gait normal.    Extremities - No LE edema. Skin - normal coloration and normal turgor. No cyanosis, no rash. Medical History- Reviewed Social History- Reviewed Surgical History- Reviewed   Vernon Solorio has a past medical history of Anemia, Arthritis, Cerebral thrombosis with cerebral infarction ([address] (6/16/2015), Dementia ([address] GERD (gastroesophageal reflux disease), HLD (hyperlipidemia), Hypertension, Stroke ([address] and Thyroid disease. Vernon Solorio reports that she has never smoked. She has never used smokeless tobacco. She reports that she does not drink alcohol and does not use drugs. Vernon Solorio has a past surgical history that includes hx hysterectomy and hx cholecystectomy. Problem List- Reviewed   Vernon Solorio has GERD (gastroesophageal reflux disease), Hypertension, Arthritis, GI bleed, Symptomatic anemia, Right hemiparesis ([address] On statin therapy, Antiplatelet or antithrombotic long-term use, Late onset Alzheimer's disease without behavioral disturbance ([address] Stage 3 chronic kidney disease ([address] and Stroke ([address] on their problem list.     Current Outpatient Medications   Medication Instructions    acetaminophen (TYLENOL ARTHRITIS PAIN) 650 mg, EVERY 6 HOURS AS NEEDED    amLODIPine (NORVASC) 10 mg, Oral, DAILY    ascorbic acid (vitamin C) (VITAMIN C) 250 mg, Oral, 2 TIMES DAILY WITH MEALS    aspirin 81 mg, Oral, DAILY    atorvastatin (LIPITOR) 40 mg tablet Take 1 tablet by mouth once daily    carvediloL (COREG) 6.25 mg tablet TAKE 1 TABLET BY MOUTH TWICE DAILY WITH MEALS    diclofenac (VOLTAREN) 1 g, Topical, 4 TIMES DAILY    ferrous sulfate 325 mg, Oral, 2 TIMES DAILY WITH MEALS    pantoprazole (PROTONIX) 40 mg tablet Take 1 tablet by mouth once daily    therapeutic multivitamin (THERAGRAN) tablet 1 Tablet, Oral, DAILY         An electronic signature was used to authenticate this note.   -- Mateo Huynh MD

## 2022-04-25 DIAGNOSIS — I10 ESSENTIAL HYPERTENSION: ICD-10-CM

## 2022-04-26 RX ORDER — CARVEDILOL 6.25 MG/1
TABLET ORAL
Qty: 180 TABLET | Refills: 1 | Status: SHIPPED | OUTPATIENT
Start: 2022-04-26 | End: 2022-10-14 | Stop reason: SDUPTHER

## 2022-05-09 DIAGNOSIS — E78.2 MIXED HYPERLIPIDEMIA: ICD-10-CM

## 2022-05-09 DIAGNOSIS — I10 ESSENTIAL HYPERTENSION: ICD-10-CM

## 2022-05-10 RX ORDER — ATORVASTATIN CALCIUM 40 MG/1
40 TABLET, FILM COATED ORAL DAILY
Qty: 90 TABLET | Refills: 1 | Status: SHIPPED | OUTPATIENT
Start: 2022-05-10 | End: 2022-10-14 | Stop reason: SDUPTHER

## 2022-05-10 RX ORDER — AMLODIPINE BESYLATE 10 MG/1
10 TABLET ORAL DAILY
Qty: 90 TABLET | Refills: 1 | Status: SHIPPED | OUTPATIENT
Start: 2022-05-10 | End: 2022-10-14 | Stop reason: SDUPTHER

## 2022-06-27 PROBLEM — N95.9 MENOPAUSAL AND POSTMENOPAUSAL DISORDER: Status: ACTIVE | Noted: 2022-06-27

## 2022-06-27 PROBLEM — N18.30 CHRONIC RENAL DISEASE, STAGE III (HCC): Status: ACTIVE | Noted: 2022-06-27

## 2022-06-27 PROBLEM — E78.2 MIXED HYPERLIPIDEMIA: Status: ACTIVE | Noted: 2022-06-27

## 2022-06-27 PROBLEM — Z86.73 HISTORY OF CVA (CEREBROVASCULAR ACCIDENT): Status: ACTIVE | Noted: 2022-06-27

## 2022-07-01 ENCOUNTER — OFFICE VISIT (OUTPATIENT)
Dept: INTERNAL MEDICINE CLINIC | Age: 87
End: 2022-07-01
Payer: MEDICARE

## 2022-07-01 VITALS
WEIGHT: 176 LBS | SYSTOLIC BLOOD PRESSURE: 128 MMHG | OXYGEN SATURATION: 97 % | BODY MASS INDEX: 32.39 KG/M2 | DIASTOLIC BLOOD PRESSURE: 74 MMHG | RESPIRATION RATE: 16 BRPM | TEMPERATURE: 97.9 F | HEIGHT: 62 IN | HEART RATE: 72 BPM

## 2022-07-01 DIAGNOSIS — E55.9 VITAMIN D DEFICIENCY: ICD-10-CM

## 2022-07-01 DIAGNOSIS — I10 PRIMARY HYPERTENSION: ICD-10-CM

## 2022-07-01 DIAGNOSIS — Z79.899 ON STATIN THERAPY: ICD-10-CM

## 2022-07-01 DIAGNOSIS — N18.32 STAGE 3B CHRONIC KIDNEY DISEASE (HCC): ICD-10-CM

## 2022-07-01 DIAGNOSIS — F02.80 LATE ONSET ALZHEIMER'S DISEASE WITHOUT BEHAVIORAL DISTURBANCE (HCC): ICD-10-CM

## 2022-07-01 DIAGNOSIS — E78.2 MIXED HYPERLIPIDEMIA: ICD-10-CM

## 2022-07-01 DIAGNOSIS — Z86.73 HISTORY OF CVA (CEREBROVASCULAR ACCIDENT): ICD-10-CM

## 2022-07-01 DIAGNOSIS — N95.9 MENOPAUSAL AND POSTMENOPAUSAL DISORDER: ICD-10-CM

## 2022-07-01 DIAGNOSIS — Z79.02 ANTIPLATELET OR ANTITHROMBOTIC LONG-TERM USE: ICD-10-CM

## 2022-07-01 DIAGNOSIS — G30.1 LATE ONSET ALZHEIMER'S DISEASE WITHOUT BEHAVIORAL DISTURBANCE (HCC): ICD-10-CM

## 2022-07-01 DIAGNOSIS — G81.91 RIGHT HEMIPARESIS (HCC): ICD-10-CM

## 2022-07-01 PROCEDURE — G8536 NO DOC ELDER MAL SCRN: HCPCS | Performed by: INTERNAL MEDICINE

## 2022-07-01 PROCEDURE — 99214 OFFICE O/P EST MOD 30 MIN: CPT | Performed by: INTERNAL MEDICINE

## 2022-07-01 PROCEDURE — G8417 CALC BMI ABV UP PARAM F/U: HCPCS | Performed by: INTERNAL MEDICINE

## 2022-07-01 PROCEDURE — G8510 SCR DEP NEG, NO PLAN REQD: HCPCS | Performed by: INTERNAL MEDICINE

## 2022-07-01 PROCEDURE — 1101F PT FALLS ASSESS-DOCD LE1/YR: CPT | Performed by: INTERNAL MEDICINE

## 2022-07-01 PROCEDURE — 1090F PRES/ABSN URINE INCON ASSESS: CPT | Performed by: INTERNAL MEDICINE

## 2022-07-01 PROCEDURE — G8427 DOCREV CUR MEDS BY ELIG CLIN: HCPCS | Performed by: INTERNAL MEDICINE

## 2022-07-01 RX ORDER — DESONIDE 0.5 MG/G
OINTMENT TOPICAL 2 TIMES DAILY
COMMUNITY

## 2022-07-01 NOTE — PROGRESS NOTES
1. \"Have you been to the ER, urgent care clinic since your last visit? Hospitalized since your last visit? \" No    2. \"Have you seen or consulted any other health care providers outside of the [address] [place] [address] since your last visit? \" No     3. For patients aged 39-70: Has the patient had a colonoscopy / FIT/ Cologuard? NA - based on age      If the patient is female:    4. For patients aged 41-77: Has the patient had a mammogram within the past 2 years? NA - based on age or sex      11. For patients aged 21-65: Has the patient had a pap smear?  NA - based on age or sex     Visit Vitals  /70   Pulse 75   Temp 97.9 °F (36.6 °C) (Temporal)   Resp 16   Ht 5' 2\" (1.575 m)   Wt 176 lb (79.8 kg)   SpO2 97%   BMI 32.19 kg/m²       Chief Complaint   Patient presents with    Follow Up Chronic Condition    Hypertension

## 2022-07-01 NOTE — PROGRESS NOTES
Dana Youngblood is a 80 y.o. female and presents with Follow Up Chronic Condition and Hypertension    Ms. Jaylen Muñoz came with her  and brought by her granddaughter Ms. BODØ. Patient is wheelchair-bound having right-sided stroke with hemiparesis in the right upper limb and right lower limb and having focal neurological deficit in right side of the body. She used to see at [facility] with Dr. Jame Mckee. She has relocated to dispute and and change the PCP and now wants to establish with me. She is not providing that detailed history. History is provided by her  who takes care of her and her granddaughter Ms. NAPOLES is helping me. Her blood pressure is controlled with current medication regimen. She has forgetfulness and mild dementia but according to her  no behavioral problems and it is not worsening and not improving. She has CKD stage IIIb to stage III 80. She is on statin having hypercholesteremia and history of CVA. She has never done bone density agreed to do a bone density. She has history of vitamin D deficiency. Labs ordered. Dinah pérez    She has not done recent lab last labs were done at [facility] office in November 2020 when I reviewed and discussed with them. Review of Systems    Review of Systems   Constitutional: Negative. HENT: Negative for sinus pain and sore throat. Eyes: Negative for blurred vision. Respiratory: Negative for cough, shortness of breath and wheezing. Cardiovascular: Negative. Gastrointestinal: Negative. Genitourinary: Negative. Musculoskeletal: Negative. Neurological: Positive for focal weakness. Negative for dizziness, tingling, tremors, sensory change and headaches. History of stroke affected right-sided upper and lower extremity. Chronic focal weakness due to stroke     Psychiatric/Behavioral: Positive for memory loss. Negative for hallucinations, substance abuse and suicidal ideas.  The patient is not nervous/anxious. Past Medical History:   Diagnosis Date    Anemia     Arthritis     Cerebral thrombosis with cerebral infarction ([facility] [address] 6/16/2015    Dementia (HCC)     GERD (gastroesophageal reflux disease)     HLD (hyperlipidemia)     Hypertension     Stroke [facility])     Thyroid disease     thinks had partial resection? Not on meds and normal TSH 10/2016     Past Surgical History:   Procedure Laterality Date    HX CHOLECYSTECTOMY      HX HYSTERECTOMY      Can't remember why but is sure she never had cancer. Social History     Socioeconomic History    Marital status:    Tobacco Use    Smoking status: Never Smoker    Smokeless tobacco: Never Used   Substance and Sexual Activity    Alcohol use: No    Drug use: No    Sexual activity: Not Currently     Partners: Male     Comment:      Family History   Problem Relation Age of Onset    Cancer Mother         colon    Alzheimer's Disease Mother     Cancer Father         bladder    Cancer Sister         not sure what kind    Cancer Brother         not sure what kind    Heart Disease Maternal Grandmother     Stroke Other         at young age     Current Outpatient Medications   Medication Sig Dispense Refill    desonide (DESOWEN) 0.05 % topical ointment Apply  to affected area two (2) times a day.  amLODIPine (NORVASC) 10 mg tablet Take 1 Tablet by mouth daily. 90 Tablet 1    atorvastatin (LIPITOR) 40 mg tablet Take 1 Tablet by mouth daily. 90 Tablet 1    carvediloL (COREG) 6.25 mg tablet TAKE 1 TABLET BY MOUTH TWICE DAILY WITH MEALS 180 Tablet 1    ascorbic acid (VITAMIN C) 250 mg tablet Take 1 Tab by mouth two (2) times daily (with meals). 60 Tab 3    aspirin 81 mg chewable tablet Take 1 Tab by mouth daily. 100 Tab 3    ferrous sulfate 325 mg (65 mg iron) tablet Take 1 Tab by mouth two (2) times daily (with meals). 60 Tab 3    therapeutic multivitamin (THERAGRAN) tablet Take 1 Tab by mouth daily.       acetaminophen (TYLENOL ARTHRITIS PAIN) 650 mg CR tablet Take 650 mg by mouth every six (6) hours as needed for Pain. No Known Allergies    Objective:  Visit Vitals  /74 (BP 1 Location: Left upper arm, BP Patient Position: Sitting, BP Cuff Size: Adult)   Pulse 72   Temp 97.9 °F (36.6 °C) (Temporal)   Resp 16   Ht 5' 2\" (1.575 m)   Wt 176 lb (79.8 kg)   SpO2 97%   BMI 32.19 kg/m²       Physical Exam:   Constitutional: General Appearance Pleasant: . Level of Distress: NAD. Psychiatric: Mental Status: normal mood and affect Orientation: to time, place, and person. ,normal eye contact. Head: Head: normocephalic and atraumatic. Eyes: Pupils: PERRLA. Sclerae: non-icteric. Neck: Neck: supple, trachea midline, and no masses. Lymph Nodes: no cervical LAD. Thyroid: no enlargement or nodules and non-tender. Lungs: Respiratory effort: no dyspnea. Auscultation: no wheezing, rales/crackles, or rhonchi and breath sounds normal and good air movement. Cardiovascular: Apical Impulse: not displaced. Heart Auscultation: normal S1 and S2; no murmurs, rubs, or gallops; and RRR. Neck vessels: no carotid bruits. Pulses including femoral / pedal: normal throughout. Abdomen: Bowel Sounds: normal. Inspection and Palpation: no tenderness, guarding, or masses and soft and non-distended. Liver: non-tender and no hepatomegaly. Spleen: non-tender and no splenomegaly. Musculoskeletal[de-identified] Extremities: no edema,no varicosities. No Calf tenderness. Range of movement affected in the right upper extremity light of all extremities. Neurologic: Gait and Station: [employer]. Motor Strength abnormal right upper and right lower extremity and normal left. Side. Focal neurological deficit in right upper and right lower limb and contracture at the right elbow. Skin: Inspection and palpation: no rash, lesions, or ulcer.        Results for orders placed or performed in visit on 67/26/04   METABOLIC PANEL, BASIC   Result Value Ref Range Sodium 137 136 - 145 mmol/L    Potassium 4.0 3.5 - 5.1 mmol/L    Chloride 105 97 - 108 mmol/L    CO2 26 21 - 32 mmol/L    Anion gap 6 5 - 15 mmol/L    Glucose 167 (H) 65 - 100 mg/dL    BUN 13 6 - 20 MG/DL    Creatinine 1.44 (H) 0.55 - 1.02 MG/DL    BUN/Creatinine ratio 9 (L) 12 - 20      GFR est AA 42 (L) >60 ml/min/1.73m2    GFR est non-AA 35 (L) >60 ml/min/1.73m2    Calcium 9.9 8.5 - 10.1 MG/DL   LIPID PANEL   Result Value Ref Range    Cholesterol, total 123 <200 MG/DL    Triglyceride 226 (H) <150 MG/DL    HDL Cholesterol 47 MG/DL    LDL, calculated 30.8 0 - 100 MG/DL    VLDL, calculated 45.2 MG/DL    CHOL/HDL Ratio 2.6 0.0 - 5.0     HEMOGLOBIN A1C WITH EAG   Result Value Ref Range    Hemoglobin A1c 5.4 4.0 - 5.6 %    Est. average glucose 108 mg/dL       Assessment/Plan:      ICD-10-CM ICD-9-CM    1. Primary hypertension  I10 401.9 CBC WITH AUTOMATED DIFF      METABOLIC PANEL, COMPREHENSIVE      TSH 3RD GENERATION   2. Mixed hyperlipidemia  E78.2 272.2 LIPID PANEL   3. Stage 3b chronic kidney disease (HCC)  N18.32 585.3 CBC WITH AUTOMATED DIFF      METABOLIC PANEL, COMPREHENSIVE   4. Right hemiparesis ([facility])  G81.91 342.90    5. Late onset Alzheimer's disease without behavioral disturbance ([facility])  G30.1 331.0     F02.80 294.10    6. History of CVA (cerebrovascular accident)  Z86.73 V12.54 LIPID PANEL   7. Antiplatelet or antithrombotic long-term use  Z79.02 V58.63    8. Vitamin D deficiency  E55.9 268.9 VITAMIN D, 25 HYDROXY   9. On statin therapy  Z79.899 V58.69 LIPID PANEL   10.  Menopausal and postmenopausal disorder  N95.9 627.9 DEXA BONE DENSITY STUDY AXIAL     Orders Placed This Encounter    DEXA BONE DENSITY STUDY AXIAL     Standing Status:   Future     Standing Expiration Date:   10/1/2022    CBC WITH AUTOMATED DIFF    METABOLIC PANEL, COMPREHENSIVE    LIPID PANEL    TSH 3RD GENERATION    VITAMIN D, 25 HYDROXY    desonide (DESOWEN) 0.05 % topical ointment     Sig: Apply  to affected area two (2) times a day.       Hypertension controlled continued on amlodipine 10 mg once a day. Continued on carvedilol 6.25 mg twice a day. Diet low in sodium. CKD stage IIIa-IIIb avoid NSAIDs avoid dehydration's monitoring of renal function labs ordered. She is not on diuretic. Hypercholesteremia /mixed hyperlipidemia continue atorvastatin 40 mg at bedtime diet low in fat. Hemiparesis due to history of stroke affecting right upper and right lower extremity with hemiparesis and contracture and focal neurological deficit in right upper limb and continued on low-dose of aspirin 81 mg/day and atorvastatin 40 mg at bedtime control of blood pressure. Anemia most likely due to CKD continue iron supplementation. Continue multivitamin containing vitamin D3 and calcium. History of vitamin D deficiency labs ordered. Bone density ordered. Recommended to get Shingrix vaccine she had chickenpox in early age. Follow-up in 3 months for Medicare wellness visit and regular follow-up. I reviewed the notes from previous PCP that she was following at [facility]. With Dr. Maximo Marroquin      follow low fat diet, follow low salt diet, continue present plan, routine labs ordered, call if any problems      There are no Patient Instructions on file for this visit. Follow-up and Dispositions    · Return in 3 months (on 10/1/2022) for MEDICARE PLUS , follow up for chronic condition.

## 2022-09-09 ENCOUNTER — TELEPHONE (OUTPATIENT)
Dept: INTERNAL MEDICINE CLINIC | Age: 87
End: 2022-09-09

## 2022-09-09 NOTE — TELEPHONE ENCOUNTER
The patients grand-daughter called and stated that he tested positive for covid today. Her s/s stated yesterday. Pt has a fever of 99.8. Pt has not tried any OTC medications. The patient wants to know if you can prescribe him something for covid.

## 2022-09-10 NOTE — TELEPHONE ENCOUNTER
I spoke with Ms. Misty Cardenas her granddaughter. According to Ms. Misty Mandujano is not eating that much she has underlying renal insufficiency. She established with me in July 2022 I had ordered labs they have not done any labs for most recent labs last blood work was done last year in October 2021 according to Ms. Misty Cardenas she has very low grade fever around 99 and slight runny nose and slight diarrhea but not serious symptoms and no shortness of breath not much cough and no sore throat she has poor appetite since long she has underlying dementia she does not want to try new antiviral medicine for COVID she wants to treat with symptomatically I had a long talk around 730 yesterday with Ms. Moore Tram and that is why declined further refills.   She said she was also debating that it should be given or not but then she decided not to be given that her grandmother may not tolerate side effects

## 2022-09-29 LAB
25(OH)D3+25(OH)D2 SERPL-MCNC: 38.4 NG/ML (ref 30–100)
ALBUMIN SERPL-MCNC: 4.5 G/DL (ref 3.6–4.6)
ALBUMIN/GLOB SERPL: 1.8 {RATIO} (ref 1.2–2.2)
ALP SERPL-CCNC: 136 IU/L (ref 44–121)
ALT SERPL-CCNC: 24 IU/L (ref 0–32)
AST SERPL-CCNC: 36 IU/L (ref 0–40)
BASOPHILS # BLD AUTO: 0.1 X10E3/UL (ref 0–0.2)
BASOPHILS NFR BLD AUTO: 1 %
BILIRUB SERPL-MCNC: 0.6 MG/DL (ref 0–1.2)
BUN SERPL-MCNC: 18 MG/DL (ref 8–27)
BUN/CREAT SERPL: 14 (ref 12–28)
CALCIUM SERPL-MCNC: 9.8 MG/DL (ref 8.7–10.3)
CHLORIDE SERPL-SCNC: 101 MMOL/L (ref 96–106)
CHOLEST SERPL-MCNC: 111 MG/DL (ref 100–199)
CO2 SERPL-SCNC: 23 MMOL/L (ref 20–29)
CREAT SERPL-MCNC: 1.25 MG/DL (ref 0.57–1)
EGFR: 42 ML/MIN/1.73
EOSINOPHIL # BLD AUTO: 0.4 X10E3/UL (ref 0–0.4)
EOSINOPHIL NFR BLD AUTO: 4 %
ERYTHROCYTE [DISTWIDTH] IN BLOOD BY AUTOMATED COUNT: 13.2 % (ref 11.7–15.4)
GLOBULIN SER CALC-MCNC: 2.5 G/DL (ref 1.5–4.5)
GLUCOSE SERPL-MCNC: 168 MG/DL (ref 70–99)
HCT VFR BLD AUTO: 40.5 % (ref 34–46.6)
HDLC SERPL-MCNC: 45 MG/DL
HGB BLD-MCNC: 13.3 G/DL (ref 11.1–15.9)
IMM GRANULOCYTES # BLD AUTO: 0 X10E3/UL (ref 0–0.1)
IMM GRANULOCYTES NFR BLD AUTO: 0 %
LDLC SERPL CALC-MCNC: 31 MG/DL (ref 0–99)
LYMPHOCYTES # BLD AUTO: 1.7 X10E3/UL (ref 0.7–3.1)
LYMPHOCYTES NFR BLD AUTO: 16 %
MCH RBC QN AUTO: 29.7 PG (ref 26.6–33)
MCHC RBC AUTO-ENTMCNC: 32.8 G/DL (ref 31.5–35.7)
MCV RBC AUTO: 90 FL (ref 79–97)
MONOCYTES # BLD AUTO: 0.8 X10E3/UL (ref 0.1–0.9)
MONOCYTES NFR BLD AUTO: 7 %
NEUTROPHILS # BLD AUTO: 8 X10E3/UL (ref 1.4–7)
NEUTROPHILS NFR BLD AUTO: 72 %
PLATELET # BLD AUTO: 352 X10E3/UL (ref 150–450)
POTASSIUM SERPL-SCNC: 4.7 MMOL/L (ref 3.5–5.2)
PROT SERPL-MCNC: 7 G/DL (ref 6–8.5)
RBC # BLD AUTO: 4.48 X10E6/UL (ref 3.77–5.28)
SODIUM SERPL-SCNC: 143 MMOL/L (ref 134–144)
TRIGL SERPL-MCNC: 228 MG/DL (ref 0–149)
TSH SERPL DL<=0.005 MIU/L-ACNC: 2.99 UIU/ML (ref 0.45–4.5)
VLDLC SERPL CALC-MCNC: 35 MG/DL (ref 5–40)
WBC # BLD AUTO: 10.9 X10E3/UL (ref 3.4–10.8)

## 2022-09-30 NOTE — PROGRESS NOTES
Please call and inform Mr. Carrington Ni s granddaughter Ms. Pennie Buerger    She has elevated leukocyte count and since last 7 years she is known to have elevated leukocyte count. Her hemoglobin is almost normal at her age of 80 . I am not sure whether Pennie Buerger wants to take opinion from blood specialist for her persistent elevated white cell count. Her blood sugar is 168. Her kidney function is now slightly better than 11-month ago now it is 42%. Her liver enzyme called alkaline phosphatase improved than before. Her triglyceride count is 228 cut back increase oily and fried food otherwise labs are excellent at her age    Thyroid function normal    Vitamin D level normal 38.4 continue over-the-counter vitamin D3 1000 unit/day and if she has no kidney stone calcium 600 mg/day and healthy diet.

## 2022-10-09 PROBLEM — E55.9 VITAMIN D DEFICIENCY: Status: RESOLVED | Noted: 2022-07-01 | Resolved: 2022-10-09

## 2022-10-14 ENCOUNTER — OFFICE VISIT (OUTPATIENT)
Dept: INTERNAL MEDICINE CLINIC | Age: 87
End: 2022-10-14
Payer: MEDICARE

## 2022-10-14 VITALS
HEIGHT: 62 IN | DIASTOLIC BLOOD PRESSURE: 70 MMHG | BODY MASS INDEX: 31.65 KG/M2 | WEIGHT: 172 LBS | TEMPERATURE: 95.2 F | RESPIRATION RATE: 16 BRPM | SYSTOLIC BLOOD PRESSURE: 130 MMHG | OXYGEN SATURATION: 97 % | HEART RATE: 72 BPM

## 2022-10-14 DIAGNOSIS — L30.4 INTERTRIGO: ICD-10-CM

## 2022-10-14 DIAGNOSIS — Z79.899 ON STATIN THERAPY: ICD-10-CM

## 2022-10-14 DIAGNOSIS — G81.91 RIGHT HEMIPARESIS (HCC): ICD-10-CM

## 2022-10-14 DIAGNOSIS — I10 ESSENTIAL HYPERTENSION: ICD-10-CM

## 2022-10-14 DIAGNOSIS — Z86.73 HISTORY OF CVA (CEREBROVASCULAR ACCIDENT): ICD-10-CM

## 2022-10-14 DIAGNOSIS — G30.1 LATE ONSET ALZHEIMER'S DISEASE WITHOUT BEHAVIORAL DISTURBANCE (HCC): ICD-10-CM

## 2022-10-14 DIAGNOSIS — N18.32 STAGE 3B CHRONIC KIDNEY DISEASE (HCC): ICD-10-CM

## 2022-10-14 DIAGNOSIS — I10 PRIMARY HYPERTENSION: ICD-10-CM

## 2022-10-14 DIAGNOSIS — N95.9 MENOPAUSAL AND POSTMENOPAUSAL DISORDER: ICD-10-CM

## 2022-10-14 DIAGNOSIS — Z79.02 ANTIPLATELET OR ANTITHROMBOTIC LONG-TERM USE: ICD-10-CM

## 2022-10-14 DIAGNOSIS — E78.2 MIXED HYPERLIPIDEMIA: ICD-10-CM

## 2022-10-14 DIAGNOSIS — R21 RASH AND NONSPECIFIC SKIN ERUPTION: ICD-10-CM

## 2022-10-14 DIAGNOSIS — F02.80 LATE ONSET ALZHEIMER'S DISEASE WITHOUT BEHAVIORAL DISTURBANCE (HCC): ICD-10-CM

## 2022-10-14 DIAGNOSIS — Z00.00 MEDICARE ANNUAL WELLNESS VISIT, SUBSEQUENT: ICD-10-CM

## 2022-10-14 PROCEDURE — G8536 NO DOC ELDER MAL SCRN: HCPCS | Performed by: INTERNAL MEDICINE

## 2022-10-14 PROCEDURE — 1090F PRES/ABSN URINE INCON ASSESS: CPT | Performed by: INTERNAL MEDICINE

## 2022-10-14 PROCEDURE — G8427 DOCREV CUR MEDS BY ELIG CLIN: HCPCS | Performed by: INTERNAL MEDICINE

## 2022-10-14 PROCEDURE — G8417 CALC BMI ABV UP PARAM F/U: HCPCS | Performed by: INTERNAL MEDICINE

## 2022-10-14 PROCEDURE — G0439 PPPS, SUBSEQ VISIT: HCPCS | Performed by: INTERNAL MEDICINE

## 2022-10-14 PROCEDURE — 99214 OFFICE O/P EST MOD 30 MIN: CPT | Performed by: INTERNAL MEDICINE

## 2022-10-14 PROCEDURE — 1101F PT FALLS ASSESS-DOCD LE1/YR: CPT | Performed by: INTERNAL MEDICINE

## 2022-10-14 PROCEDURE — G8510 SCR DEP NEG, NO PLAN REQD: HCPCS | Performed by: INTERNAL MEDICINE

## 2022-10-14 RX ORDER — CARVEDILOL 6.25 MG/1
TABLET ORAL
Qty: 180 TABLET | Refills: 1 | Status: SHIPPED | OUTPATIENT
Start: 2022-10-14

## 2022-10-14 RX ORDER — ASPIRIN 81 MG/1
TABLET ORAL DAILY
COMMUNITY

## 2022-10-14 RX ORDER — CLOTRIMAZOLE AND BETAMETHASONE DIPROPIONATE 10; .64 MG/G; MG/G
CREAM TOPICAL 2 TIMES DAILY
Qty: 45 G | Refills: 5 | Status: SHIPPED | OUTPATIENT
Start: 2022-10-14

## 2022-10-14 RX ORDER — ATORVASTATIN CALCIUM 40 MG/1
TABLET, FILM COATED ORAL
COMMUNITY

## 2022-10-14 RX ORDER — AMLODIPINE BESYLATE 10 MG/1
10 TABLET ORAL DAILY
Qty: 90 TABLET | Refills: 1 | Status: SHIPPED | OUTPATIENT
Start: 2022-10-14

## 2022-10-14 RX ORDER — ATORVASTATIN CALCIUM 40 MG/1
40 TABLET, FILM COATED ORAL DAILY
Qty: 90 TABLET | Refills: 1 | Status: SHIPPED | OUTPATIENT
Start: 2022-10-14 | End: 2022-10-14

## 2022-10-14 NOTE — PROGRESS NOTES
Jane Berumen (: 1935) is a 80 y.o. female, established patient, here for evaluation of the following chief complaint(s):  Follow Up Chronic Condition and Annual Wellness Visit         ASSESSMENT/PLAN:  Below is the assessment and plan developed based on review of pertinent history, physical exam, labs, studies, and medications. 1. Medicare annual wellness visit, subsequent  2. Primary hypertension  -     CBC WITH AUTOMATED DIFF  -     METABOLIC PANEL, COMPREHENSIVE  3. Stage 3b chronic kidney disease (HCC)  -     CBC WITH AUTOMATED DIFF  -     METABOLIC PANEL, COMPREHENSIVE  4. Right hemiparesis ([facility] [address]  5. Late onset Alzheimer's disease without behavioral disturbance ([facility] [address]  6. Mixed hyperlipidemia  7. Intertrigo  8. History of CVA (cerebrovascular accident)  9. Antiplatelet or antithrombotic long-term use  10. On statin therapy  11. Menopausal and postmenopausal disorder  12. Rash and nonspecific skin eruption  13. Essential hypertension  -     amLODIPine (NORVASC) 10 mg tablet; Take 1 Tablet by mouth daily. , Normal, Disp-90 Tablet, R-1  -     carvediloL (COREG) 6.25 mg tablet; TAKE 1 TABLET BY MOUTH TWICE DAILY WITH MEALS, Normal, Disp-180 Tablet, R-1    Hypertension controlled continue current medicines including amlodipine 10 mg once a day and carvedilol 6.25 mg every 12 hourly. Low-sodium diet. Right-sided hemiparesis, wheelchair-bound, not able to walk and unable to walk independent for ADL and IADL having history of right-sided CVA came in wheelchair/manual.    Continued on atorvastatin 40 mg at bedtime with aspirin 81 mg once a day. She is not taking chewable tablet she is taking oral pill. CKD stage IIIb she does not take any oral NSAIDs avoid dehydration. Her  takes care of her ,. Patient is not much verbal.  History is provided by her  her creatinine improved from 1.4-1. 25. Discussed with her .   Her hemoglobin is 13.3 she can try to stop iron repeat CBC in next 3 months. She has persistent leukocytosis fluctuating and family is aware and that she has for last several years do not want to take opinion from hematologist.    Alzheimer dementia without behavioral disturbance. Intertrigo underneath the breast she used to take desonide recommended to stop desonide start clotrimazole betamethasone cream and also apply the cream around the right medial malleolus  on the right ankle. She has normal hemoglobin. She can stop iron she can take calcium 600 mg/day vitamin D3 2000 unit/day and vitamin B complex daily or multivitamin once a day. She can stop iron and repeat CBC BMP next 6 months. Immunization counseling given. Does not want to do a bone density. Recommended to get Shingrix vaccine, flu vaccine and booster dose of COVID-vaccine. She can use over-the-counter Debrox eardrops. Return in about 6 months (around 4/14/2023) for follow up for chronic condition. SUBJECTIVE/OBJECTIVE:    HPI:  Ms. Anshu Varghese is brought by her  in wheelchair. Patient is not verbal and not providing any history. Her  is providing history. Her  is concerned regarding the rash underneath the breast and also around the right ankle. Foot examination is done by me. She has done her labs I reviewed with her  Mr. Shannon Arteaga and discussed with her and she has improvement in her creatinine and hemoglobin is normal and she has slight elevated leukocytes that is chronic    She has no behavior problems except dementia and she is having right-sided hemiparesis from the old stroke and getting statin and has good control of blood pressure. She is known to have hearing loss. She has urinary incontinence from history of stroke and wearing depends. Her dementia is the same. No hallucinations. Review of Systems   Constitutional: Negative. HENT:  Negative for congestion, hearing loss and sore throat. Eyes:  Negative for blurred vision. Respiratory:  Negative for cough, shortness of breath and wheezing. Cardiovascular: Negative. Gastrointestinal:  Negative for abdominal pain, constipation, heartburn and vomiting. Genitourinary: Negative. Urine incontinance and wears depends   Skin:  Positive for rash. Negative for itching. Neurological:  Positive for focal weakness. Negative for dizziness, sensory change and headaches. Right sided weakness due to stroke which is chronic. Psychiatric/Behavioral:  Positive for memory loss. Negative for hallucinations, substance abuse and suicidal ideas. The patient is not nervous/anxious and does not have insomnia. Dementia     Vitals:    10/14/22 1211 10/14/22 1244   BP: 102/64 130/70   Pulse: 72 72   Resp: 16    Temp: (!) 95.2 °F (35.1 °C)    TempSrc: Temporal    SpO2: 97%    Weight: 172 lb (78 kg)    Height: 5' 2\" (1.575 m)       Physical Exam  Vitals and nursing note reviewed. Constitutional:       General: She is not in acute distress. Appearance: She is obese. HENT:      Head:      Comments: Mild wax both ears. Right Ear: There is impacted cerumen. Left Ear: There is impacted cerumen. Eyes:      Pupils: Pupils are equal, round, and reactive to light. Cardiovascular:      Rate and Rhythm: Normal rate and regular rhythm. Pulses: Normal pulses. Heart sounds: Normal heart sounds. No murmur heard. Pulmonary:      Effort: Pulmonary effort is normal.      Breath sounds: No wheezing or rhonchi. Abdominal:      General: There is no distension. Palpations: Abdomen is soft. Tenderness: There is no guarding. Musculoskeletal:      Right lower leg: No edema. Left lower leg: No edema. Skin:     Findings: Lesion and rash present. No bruising. Comments: Intertrigo.,underneath breast ,and small circular rash on the medial aspect of rt ankle. Looks like tenia   Neurological:      Mental Status: Mental status is at baseline. Motor: Weakness present. Comments: Wheel chair bound, right-sided hemiparesis and focal neurological deficit. Unable to ambulate and wheelchair-bound   Psychiatric:         Behavior: Behavior normal.       On this date 10/14/2022 I have spent 50 minutes reviewing previous notes, test results and face to face with the patient discussing the diagnosis and importance of compliance with the treatment plan as well as documenting on the day of the visit. Signed by:  Arely Vaughn MD  1. \"Have you been to the ER, urgent care clinic since your last visit? Hospitalized since your last visit? \" No    2. \"Have you seen or consulted any other health care providers outside of the [address] [place] [address] since your last visit? \" No     3. For patients aged 39-70: Has the patient had a colonoscopy / FIT/ Cologuard? NA - based on age      If the patient is female:    4. For patients aged 41-77: Has the patient had a mammogram within the past 2 years? NA - based on age or sex      11. For patients aged 21-65: Has the patient had a pap smear? NA - based on age or sex       Chief Complaint   Patient presents with    Follow Up Chronic Condition    Annual Wellness Visit        Visit Vitals  /70 (BP 1 Location: Left upper arm, BP Patient Position: Sitting, BP Cuff Size: Adult)   Pulse 72   Temp (!) 95.2 °F (35.1 °C) (Temporal)   Resp 16   Ht 5' 2\" (1.575 m)   Wt 172 lb (78 kg)   SpO2 97%   BMI 31.46 kg/m²            This is the Subsequent Medicare Annual Wellness Exam, performed 12 months or more after the Initial AWV or the last Subsequent AWV    I have reviewed the patient's medical history in detail and updated the computerized patient record. She could not do clock drawing and 3 words recall having existing diagnosis of dementia. According to her  she has hearing problems but do not want to do anything further    She is dependent on wheelchair. Also having urinary incontinence.     Recommended to have a advanced directory. Recommended to use OTC Debrox eardrops. Immunization counseling given and recommendation given. They are going to get high-strength flu vaccine from the pharmacy. Continue vitamin D3 calcium. Assessment/Plan   Education and counseling provided:  Are appropriate based on today's review and evaluation  End-of-Life planning (with patient's consent)  Pneumococcal Vaccine  Influenza Vaccine  Bone mass measurement (DEXA)  Screening for glaucoma    1. Medicare annual wellness visit, subsequent  2. Primary hypertension  -     CBC WITH AUTOMATED DIFF  -     METABOLIC PANEL, COMPREHENSIVE  3. Stage 3b chronic kidney disease (HCC)  -     CBC WITH AUTOMATED DIFF  -     METABOLIC PANEL, COMPREHENSIVE  4. Right hemiparesis ([facility] [address]  5. Late onset Alzheimer's disease without behavioral disturbance ([facility] [address]  6. Mixed hyperlipidemia  7. Intertrigo  8. History of CVA (cerebrovascular accident)  9. Antiplatelet or antithrombotic long-term use  10. On statin therapy  11. Menopausal and postmenopausal disorder  12. Rash and nonspecific skin eruption  13. Essential hypertension  -     amLODIPine (NORVASC) 10 mg tablet; Take 1 Tablet by mouth daily. , Normal, Disp-90 Tablet, R-1  -     carvediloL (COREG) 6.25 mg tablet; TAKE 1 TABLET BY MOUTH TWICE DAILY WITH MEALS, Normal, Disp-180 Tablet, R-1       Depression Risk Factor Screening     3 most recent PHQ Screens 10/14/2022   Little interest or pleasure in doing things Not at all   Feeling down, depressed, irritable, or hopeless Not at all   Total Score PHQ 2 0   Trouble falling or staying asleep, or sleeping too much Not at all   Feeling tired or having little energy Not at all   Poor appetite, weight loss, or overeating Not at all   Feeling bad about yourself - or that you are a failure or have let yourself or your family down Not at all   Trouble concentrating on things such as school, work, reading, or watching TV Not at all   Moving or speaking so slowly that other people could have noticed; or the opposite being so fidgety that others notice Not at all   Thoughts of being better off dead, or hurting yourself in some way Not at all   PHQ 9 Score 0   How difficult have these problems made it for you to do your work, take care of your home and get along with others Not difficult at all       Alcohol & Drug Abuse Risk Screen    Do you average more than 1 drink per night or more than 7 drinks a week:  No    On any one occasion in the past three months have you have had more than 3 drinks containing alcohol:  No          Functional Ability and Level of Safety    Hearing: The patient needs further evaluation. Activities of Daily Living: The home contains: grab bars  Patient needs help with:  phone, transportation, shopping, preparing meals, laundry, housework, managing medications, managing money, eating, dressing, bathing, hygiene, bathroom needs, and walking      Ambulation: wheelchair bound     Fall Risk:  Fall Risk Assessment, last 12 mths 10/14/2022   Able to walk? Yes   Fall in past 12 months? 1   Do you feel unsteady? 0   Are you worried about falling 0   Is TUG test greater than 12 seconds? 0   Is the gait abnormal? 0   Number of falls in past 12 months 1   Fall with injury?  0      Abuse Screen:  Patient is not abused       Cognitive Screening    Has your family/caregiver stated any concerns about your memory: yes - dementia     Cognitive Screening: dementia / does not recognize clock or words    Health Maintenance Due     Health Maintenance Due   Topic Date Due    Shingrix Vaccine Age 50> (1 of 2) Never done    Bone Densitometry (Dexa) Screening  Never done    COVID-19 Vaccine (4 - Booster for Berton Gals series) 02/28/2022    Flu Vaccine (1) 08/01/2022       Patient Care Team   Patient Care Team:  Erendira Connolly MD as PCP - General (Internal Medicine Physician)  Collin Trotter MD as PCP - [facility] Empaneled Provider  Nola Myrick MD (Neurology)    History Patient Active Problem List   Diagnosis Code    GERD (gastroesophageal reflux disease) K21.9    Essential hypertension I10    Right hemiparesis (HCC) G81.91    On statin therapy Z79.899    Medicare annual wellness visit, subsequent Z00.00    Late onset Alzheimer's disease without behavioral disturbance ([facility] [address] G30.1, F02.80    Stage 3 chronic kidney disease ([facility] [address] N18.30    Stroke ([facility] [address] I63.9    Chronic renal disease, stage III N18.30    History of CVA (cerebrovascular accident) Z86.73    Mixed hyperlipidemia E78.2    Menopausal and postmenopausal disorder N95.9    Intertrigo L30.4    Rash and nonspecific skin eruption R21     Past Medical History:   Diagnosis Date    Anemia     Arthritis     Cerebral thrombosis with cerebral infarction ([facility] [address] 6/16/2015    Dementia (HCC)     GERD (gastroesophageal reflux disease)     HLD (hyperlipidemia)     Hypertension     Stroke [facility])     Thyroid disease     thinks had partial resection? Not on meds and normal TSH 10/2016      Past Surgical History:   Procedure Laterality Date    HX CHOLECYSTECTOMY      HX HYSTERECTOMY      Can't remember why but is sure she never had cancer. Current Outpatient Medications   Medication Sig Dispense Refill    clotrimazole-betamethasone (LOTRISONE) topical cream Apply  to affected area two (2) times a day. To apply twice a day underneath breast and in groin. 45 g 5    amLODIPine (NORVASC) 10 mg tablet Take 1 Tablet by mouth daily. 90 Tablet 1    carvediloL (COREG) 6.25 mg tablet TAKE 1 TABLET BY MOUTH TWICE DAILY WITH MEALS 180 Tablet 1    aspirin delayed-release 81 mg tablet Take  by mouth daily. aspirin delayed-release 81 mg tablet Take  by mouth daily. atorvastatin (LIPITOR) 40 mg tablet Take  by mouth nightly. desonide (DESOWEN) 0.05 % topical ointment Apply  to affected area two (2) times a day. therapeutic multivitamin (THERAGRAN) tablet Take 1 Tab by mouth daily.       acetaminophen (TYLENOL) 650 mg TbER Take 650 mg by mouth every six (6) hours as needed for Pain. No Known Allergies    Family History   Problem Relation Age of Onset    Cancer Mother         colon    Alzheimer's Disease Mother     Cancer Father         bladder    Cancer Sister         not sure what kind    Cancer Brother         not sure what kind    Heart Disease Maternal Grandmother     Stroke Other         at young age     Social History     Tobacco Use    Smoking status: Never    Smokeless tobacco: Never   Substance Use Topics    Alcohol use: No         Signed by    Felisa Bence. M.D.

## 2022-10-14 NOTE — PATIENT INSTRUCTIONS
Medicare Wellness Visit, Female     The best way to live healthy is to have a lifestyle where you eat a well-balanced diet, exercise regularly, limit alcohol use, and quit all forms of tobacco/nicotine, if applicable. Regular preventive services are another way to keep healthy. Preventive services (vaccines, screening tests, monitoring & exams) can help personalize your care plan, which helps you manage your own care. Screening tests can find health problems at the earliest stages, when they are easiest to treat. [facility] follows the current, evidence-based guidelines published by the [facility] ([facility]) when recommending preventive services for our patients. Because we follow these guidelines, sometimes recommendations change over time as research supports it. (For example, mammograms used to be recommended annually. Even though Medicare will still pay for an annual mammogram, the newer guidelines recommend a mammogram every two years for women of average risk). Of course, you and your doctor may decide to screen more often for some diseases, based on your risk and your co-morbidities (chronic disease you are already diagnosed with). Preventive services for you include:  - Medicare offers their members a free annual wellness visit, which is time for you and your primary care provider to discuss and plan for your preventive service needs. Take advantage of this benefit every year!  -All adults over the age of 72 should receive the recommended pneumonia vaccines. Current USPSTF guidelines recommend a series of two vaccines for the best pneumonia protection.   -All adults should have a flu vaccine yearly and a tetanus vaccine every 10 years.   -All adults age 48 and older should receive the shingles vaccines (series of two vaccines).       -All adults age 38-68 who are overweight should have a diabetes screening test once every three years.   -All adults born between [birth year] and 1965 should be screened once for Hepatitis C.  -Other screening tests and preventive services for persons with diabetes include: an eye exam to screen for diabetic retinopathy, a kidney function test, a foot exam, and stricter control over your cholesterol.   -Cardiovascular screening for adults with routine risk involves an electrocardiogram (ECG) at intervals determined by your doctor.   -Colorectal cancer screenings should be done for adults age 54-65 with no increased risk factors for colorectal cancer. There are a number of acceptable methods of screening for this type of cancer. Each test has its own benefits and drawbacks. Discuss with your doctor what is most appropriate for you during your annual wellness visit. The different tests include: colonoscopy (considered the best screening method), a fecal occult blood test, a fecal DNA test, and sigmoidoscopy.    -A bone mass density test is recommended when a woman turns 65 to screen for osteoporosis. This test is only recommended one time, as a screening. Some providers will use this same test as a disease monitoring tool if you already have osteoporosis. -Breast cancer screenings are recommended every other year for women of normal risk, age 54-69.  -Cervical cancer screenings for women over age 72 are only recommended with certain risk factors.      Here is a list of your current Health Maintenance items (your personalized list of preventive services) with a due date:  Health Maintenance Due   Topic Date Due    Shingles Vaccine (1 of 2) Never done    Bone Mineral Density   Never done    COVID-19 Vaccine (4 - Booster for Kathren Duel series) 02/28/2022    Yearly Flu Vaccine (1) 08/01/2022

## 2022-11-08 RX ORDER — ATORVASTATIN CALCIUM 40 MG/1
40 TABLET, FILM COATED ORAL
Qty: 90 TABLET | Refills: 1 | Status: SHIPPED | OUTPATIENT
Start: 2022-11-08

## 2023-02-02 ENCOUNTER — OFFICE VISIT (OUTPATIENT)
Dept: INTERNAL MEDICINE CLINIC | Age: 88
End: 2023-02-02
Payer: MEDICARE

## 2023-02-02 VITALS
BODY MASS INDEX: 30.8 KG/M2 | HEIGHT: 62 IN | SYSTOLIC BLOOD PRESSURE: 106 MMHG | RESPIRATION RATE: 18 BRPM | HEART RATE: 71 BPM | WEIGHT: 167.4 LBS | OXYGEN SATURATION: 97 % | DIASTOLIC BLOOD PRESSURE: 65 MMHG | TEMPERATURE: 98 F

## 2023-02-02 DIAGNOSIS — I10 PRIMARY HYPERTENSION: ICD-10-CM

## 2023-02-02 DIAGNOSIS — Z86.73 HISTORY OF CVA (CEREBROVASCULAR ACCIDENT): Primary | ICD-10-CM

## 2023-02-02 DIAGNOSIS — G30.1 LATE ONSET ALZHEIMER'S DISEASE WITHOUT BEHAVIORAL DISTURBANCE (HCC): ICD-10-CM

## 2023-02-02 DIAGNOSIS — G81.91 RIGHT HEMIPARESIS (HCC): ICD-10-CM

## 2023-02-02 DIAGNOSIS — N18.32 STAGE 3B CHRONIC KIDNEY DISEASE (HCC): ICD-10-CM

## 2023-02-02 DIAGNOSIS — F02.80 LATE ONSET ALZHEIMER'S DISEASE WITHOUT BEHAVIORAL DISTURBANCE (HCC): ICD-10-CM

## 2023-02-02 PROCEDURE — G8417 CALC BMI ABV UP PARAM F/U: HCPCS | Performed by: STUDENT IN AN ORGANIZED HEALTH CARE EDUCATION/TRAINING PROGRAM

## 2023-02-02 PROCEDURE — G8427 DOCREV CUR MEDS BY ELIG CLIN: HCPCS | Performed by: STUDENT IN AN ORGANIZED HEALTH CARE EDUCATION/TRAINING PROGRAM

## 2023-02-02 PROCEDURE — 1090F PRES/ABSN URINE INCON ASSESS: CPT | Performed by: STUDENT IN AN ORGANIZED HEALTH CARE EDUCATION/TRAINING PROGRAM

## 2023-02-02 PROCEDURE — G8510 SCR DEP NEG, NO PLAN REQD: HCPCS | Performed by: STUDENT IN AN ORGANIZED HEALTH CARE EDUCATION/TRAINING PROGRAM

## 2023-02-02 PROCEDURE — 1101F PT FALLS ASSESS-DOCD LE1/YR: CPT | Performed by: STUDENT IN AN ORGANIZED HEALTH CARE EDUCATION/TRAINING PROGRAM

## 2023-02-02 PROCEDURE — G8536 NO DOC ELDER MAL SCRN: HCPCS | Performed by: STUDENT IN AN ORGANIZED HEALTH CARE EDUCATION/TRAINING PROGRAM

## 2023-02-02 PROCEDURE — 99214 OFFICE O/P EST MOD 30 MIN: CPT | Performed by: STUDENT IN AN ORGANIZED HEALTH CARE EDUCATION/TRAINING PROGRAM

## 2023-02-02 RX ORDER — MULTIVIT WITH MINERALS/HERBS
1 TABLET ORAL DAILY
COMMUNITY

## 2023-02-02 RX ORDER — LANOLIN ALCOHOL/MO/W.PET/CERES
CREAM (GRAM) TOPICAL
COMMUNITY

## 2023-02-02 RX ORDER — GLUCOSAMINE SULFATE 1500 MG
1 POWDER IN PACKET (EA) ORAL DAILY
COMMUNITY

## 2023-02-02 RX ORDER — CALCIUM CARBONATE 600 MG
600 TABLET ORAL DAILY
COMMUNITY

## 2023-02-02 RX ORDER — ATORVASTATIN CALCIUM 40 MG/1
40 TABLET, FILM COATED ORAL
Qty: 90 TABLET | Refills: 1 | Status: SHIPPED | OUTPATIENT
Start: 2023-02-02

## 2023-02-02 NOTE — PROGRESS NOTES
Jerry Willis (: 1935) is a 80 y.o. female, established patient, here for evaluation of the following chief complaint(s):  Follow-up, Hypertension, Chronic Kidney Disease, and Cholesterol Problem       ASSESSMENT/PLAN:  Below is the assessment and plan developed based on review of pertinent history, physical exam, labs, studies, and medications. 1. History of CVA (cerebrovascular accident)  -     atorvastatin (LIPITOR) 40 mg tablet; Take 1 Tablet by mouth nightly., Normal, Disp-90 Tablet, R-1  2. Right hemiparesis ([address]  3. Primary hypertension  -     CBC WITH AUTOMATED DIFF  -     METABOLIC PANEL, BASIC  4. Late onset Alzheimer's disease without behavioral disturbance (HCC)  5. Stage 3b chronic kidney disease (HCC)  -     METABOLIC PANEL, BASIC    PCP: Dr. Rosa Tierney   History of CVA with right-sided hemiparesis, dependent on  for most ADLs. Unsure why atorvastatin was discontinued on last visit, patient's  was told to resume medication, it was sent to pharmacy.  is her caretaker and is asking for home health referral for help. Order placed. Hypertension: Controlled, continue medications. Return in about 6 months (around 2023) for follow up. [facility]   SUBJECTIVE/OBJECTIVE:  HPI    Jerry Willis is an 75-year-old who presents to clinic for follow-up. She has a history of hypertension, late onset Alzheimer's,  hyperlipidemia, history of CVA     She has history of right-sided hemiparesis, wheelchair-bound. She has a history of CKD stage III. Unsure why atorvastatin was stopped by pcp during last visit although note states to continue. She is alert and oriented x 1. Able to feed herself but needs assistance with bathing, clothing, etc.  is her caretaker. Most of hx obtained from . She has persistent leukocytosis fluctuating and there was discussion about referral to hematologist before by pcp but they refused.     Asked  again, and he states he doesn't want referral.   No Known Allergies  Current Outpatient Medications   Medication Sig    ferrous sulfate 325 mg (65 mg iron) tablet Take  by mouth Daily (before breakfast). calcium carbonate (CALTREX) 600 mg calcium (1,500 mg) tablet Take 600 mg by mouth daily. b complex vitamins tablet Take 1 Tablet by mouth daily. cholecalciferol (VITAMIN D3) 25 mcg (1,000 unit) cap Take 1 Capsule by mouth daily. atorvastatin (LIPITOR) 40 mg tablet Take 1 Tablet by mouth nightly. amLODIPine (NORVASC) 10 mg tablet Take 1 Tablet by mouth daily. carvediloL (COREG) 6.25 mg tablet TAKE 1 TABLET BY MOUTH TWICE DAILY WITH MEALS    aspirin delayed-release 81 mg tablet Take  by mouth daily. desonide (DESOWEN) 0.05 % topical ointment Apply  to affected area two (2) times a day. therapeutic multivitamin (THERAGRAN) tablet Take 1 Tab by mouth daily. acetaminophen (TYLENOL) 650 mg TbER Take 650 mg by mouth every six (6) hours as needed for Pain. No current facility-administered medications for this visit. Past Medical History:   Diagnosis Date    Anemia     Arthritis     Cerebral thrombosis with cerebral infarction ([facility] [address] 6/16/2015    Dementia (HCC)     GERD (gastroesophageal reflux disease)     HLD (hyperlipidemia)     Hypertension     Stroke [facility])     Thyroid disease     thinks had partial resection? Not on meds and normal TSH 10/2016     Past Surgical History:   Procedure Laterality Date    HX CHOLECYSTECTOMY      HX HYSTERECTOMY      Can't remember why but is sure she never had cancer.       Family History   Problem Relation Age of Onset    Cancer Mother         colon    Alzheimer's Disease Mother     Cancer Father         bladder    Cancer Sister         not sure what kind    Cancer Brother         not sure what kind    Heart Disease Maternal Grandmother     Stroke Other         at young age     Social History     Tobacco Use   Smoking Status Never   Smokeless Tobacco Never Review of Systems   Constitutional:  Negative for fever and malaise/fatigue. HENT:  Negative for congestion, hearing loss and sore throat. Eyes: Negative. Respiratory:  Negative for cough and shortness of breath. Cardiovascular:  Negative for chest pain, palpitations and leg swelling. Gastrointestinal:  Negative for abdominal pain, nausea and vomiting. Skin: Negative. Neurological:  Negative for loss of consciousness and headaches. Rt sided hemiparesis, short term memory loss   Psychiatric/Behavioral: Negative. Vitals:    02/02/23 0925   BP: 106/65   Pulse: 71   Resp: 18   Temp: 98 °F (36.7 °C)   TempSrc: Oral   SpO2: 97%   Weight: 167 lb 6.4 oz (75.9 kg)   Height: 5' 2\" (1.575 m)      Physical Exam  Constitutional:       General: She is not in acute distress. Appearance: Normal appearance. HENT:      Head: Normocephalic. Nose: Nose normal.      Mouth/Throat:      Mouth: Mucous membranes are moist.   Eyes:      Extraocular Movements: Extraocular movements intact. Conjunctiva/sclera: Conjunctivae normal.      Pupils: Pupils are equal, round, and reactive to light. Cardiovascular:      Rate and Rhythm: Normal rate and regular rhythm. Heart sounds: Normal heart sounds. Pulmonary:      Effort: Pulmonary effort is normal.      Breath sounds: Normal breath sounds. Abdominal:      General: Bowel sounds are normal. There is no distension. Palpations: Abdomen is soft. Tenderness: There is no abdominal tenderness. Musculoskeletal:      Cervical back: Normal range of motion and neck supple. Neurological:      Mental Status: She is alert. Mental status is at baseline. Motor: No weakness. Comments: Wheelchair bound, alert and oriented x 1, Rt sided strength 4/5   Psychiatric:         Mood and Affect: Mood normal.         Behavior: Behavior normal.           An electronic signature was used to authenticate this note.   -- Gabriele Humphrey MD

## 2023-02-02 NOTE — PROGRESS NOTES
1. \"Have you been to the ER, urgent care clinic since your last visit? Hospitalized since your last visit? \" No    2. \"Have you seen or consulted any other health care providers outside of the [address] [place] [address] since your last visit? \" No     3. For patients aged 39-70: Has the patient had a colonoscopy / FIT/ Cologuard? NA - based on age      If the patient is female:    4. For patients aged 41-77: Has the patient had a mammogram within the past 2 years? NA - based on age or sex      11. For patients aged 21-65: Has the patient had a pap smear?  NA - based on age or sex    Chief Complaint   Patient presents with    Follow-up    Hypertension    Chronic Kidney Disease    Cholesterol Problem     Visit Vitals  /65 (BP 1 Location: Left upper arm, BP Patient Position: Sitting, BP Cuff Size: Adult)   Pulse 71   Temp 98 °F (36.7 °C) (Oral)   Resp 18   Ht 5' 2\" (1.575 m)   Wt 167 lb 6.4 oz (75.9 kg)   SpO2 97%   BMI 30.62 kg/m²

## 2023-02-13 DIAGNOSIS — I10 ESSENTIAL HYPERTENSION: ICD-10-CM

## 2023-02-17 RX ORDER — AMLODIPINE BESYLATE 10 MG/1
10 TABLET ORAL DAILY
Qty: 90 TABLET | Refills: 1 | Status: SHIPPED | OUTPATIENT
Start: 2023-02-17

## 2023-04-27 ENCOUNTER — OFFICE VISIT (OUTPATIENT)
Dept: INTERNAL MEDICINE CLINIC | Age: 88
End: 2023-04-27
Payer: MEDICARE

## 2023-04-27 ENCOUNTER — TELEPHONE (OUTPATIENT)
Dept: INTERNAL MEDICINE CLINIC | Age: 88
End: 2023-04-27

## 2023-04-27 VITALS
DIASTOLIC BLOOD PRESSURE: 62 MMHG | HEART RATE: 68 BPM | OXYGEN SATURATION: 98 % | HEIGHT: 62 IN | SYSTOLIC BLOOD PRESSURE: 116 MMHG | BODY MASS INDEX: 29.81 KG/M2 | WEIGHT: 162 LBS

## 2023-04-27 DIAGNOSIS — L30.4 INTERTRIGO: ICD-10-CM

## 2023-04-27 DIAGNOSIS — F02.80 LATE ONSET ALZHEIMER'S DISEASE WITHOUT BEHAVIORAL DISTURBANCE (HCC): ICD-10-CM

## 2023-04-27 DIAGNOSIS — R63.0 LACK OF APPETITE: Primary | ICD-10-CM

## 2023-04-27 DIAGNOSIS — Z74.1 REQUIRES DAILY ASSISTANCE FOR ACTIVITIES OF DAILY LIVING (ADL) AND COMFORT NEEDS: ICD-10-CM

## 2023-04-27 DIAGNOSIS — G81.91 RIGHT HEMIPARESIS (HCC): ICD-10-CM

## 2023-04-27 DIAGNOSIS — G30.1 LATE ONSET ALZHEIMER'S DISEASE WITHOUT BEHAVIORAL DISTURBANCE (HCC): ICD-10-CM

## 2023-04-27 DIAGNOSIS — R62.7 FAILURE TO THRIVE IN ADULT: ICD-10-CM

## 2023-04-27 DIAGNOSIS — Z86.73 HISTORY OF CVA (CEREBROVASCULAR ACCIDENT): ICD-10-CM

## 2023-04-27 PROCEDURE — G8432 DEP SCR NOT DOC, RNG: HCPCS | Performed by: INTERNAL MEDICINE

## 2023-04-27 PROCEDURE — 1090F PRES/ABSN URINE INCON ASSESS: CPT | Performed by: INTERNAL MEDICINE

## 2023-04-27 PROCEDURE — G8536 NO DOC ELDER MAL SCRN: HCPCS | Performed by: INTERNAL MEDICINE

## 2023-04-27 PROCEDURE — G0506 COMP ASSES CARE PLAN CCM SVC: HCPCS | Performed by: INTERNAL MEDICINE

## 2023-04-27 PROCEDURE — G8427 DOCREV CUR MEDS BY ELIG CLIN: HCPCS | Performed by: INTERNAL MEDICINE

## 2023-04-27 PROCEDURE — 1101F PT FALLS ASSESS-DOCD LE1/YR: CPT | Performed by: INTERNAL MEDICINE

## 2023-04-27 PROCEDURE — G8417 CALC BMI ABV UP PARAM F/U: HCPCS | Performed by: INTERNAL MEDICINE

## 2023-04-27 RX ORDER — NYSTATIN 100000 [USP'U]/G
POWDER TOPICAL 2 TIMES DAILY
Qty: 60 G | Refills: 4 | Status: SHIPPED | OUTPATIENT
Start: 2023-04-27

## 2023-04-27 NOTE — PROGRESS NOTES
Magalie Vallejo is a 80 y.o. female and presents with Follow-up (Pt not eating-but thinks teeth are bothering her)    Britni Del Castillo is Dr. Ni Pruitt patient, she is here today for acute visit with concern of not eating well and a rash under her breasts and in her pelvic region. She has Alzheimer's disease and CVA with right sided weakness, completely dependednt on her ADLs, her  is her primary caregiver. Previous notes reviewed. She is wheelchair bound, he says that he recently ordered a ramp for home but he does notknow what to do about     For the rash she has been prescribed in theh past clotrimazole with steroid which helps, but reappears and Mr. Roney Peters her  says they don't have more cream.    Roney Peters says that she has quit eating on him, he says that she still eats but very very little and often she regurgitates some food, Mr. Roney Peters cooks her vegetables, she eats a very small portion and she eats a little bit of a rachel of ground meat. She has lost 5 lbs in 2 months. Review of Systems  Review of Systems   Unable to perform ROS: Dementia        Past Medical History:   Diagnosis Date    Anemia     Arthritis     Cerebral thrombosis with cerebral infarction ([facility] [address] 6/16/2015    Dementia (HCC)     GERD (gastroesophageal reflux disease)     HLD (hyperlipidemia)     Hypertension     Stroke [facility])     Thyroid disease     thinks had partial resection? Not on meds and normal TSH 10/2016     Past Surgical History:   Procedure Laterality Date    HX CHOLECYSTECTOMY      HX HYSTERECTOMY      Can't remember why but is sure she never had cancer.       Social History     Socioeconomic History    Marital status:    Tobacco Use    Smoking status: Never    Smokeless tobacco: Never   Vaping Use    Vaping Use: Never used   Substance and Sexual Activity    Alcohol use: No    Drug use: No    Sexual activity: Not Currently     Partners: Male     Comment:      Social Determinants of Health     Financial Resource Strain: Low Risk     Difficulty of Paying Living Expenses: Not very hard   Food Insecurity: No Food Insecurity    Worried About Running Out of Food in the Last Year: Never true    Ran Out of Food in the Last Year: Never true     Family History   Problem Relation Age of Onset    Cancer Mother         colon    Alzheimer's Disease Mother     Cancer Father         bladder    Cancer Sister         not sure what kind    Cancer Brother         not sure what kind    Heart Disease Maternal Grandmother     Stroke Other         at young age     Current Outpatient Medications   Medication Sig Dispense Refill    nystatin (MYCOSTATIN) powder Apply  to affected area two (2) times a day. 60 g 4    carvediloL (COREG) 6.25 mg tablet TAKE 1 TABLET BY MOUTH TWICE DAILY WITH MEALS 180 Tablet 0    amLODIPine (NORVASC) 10 mg tablet Take 1 Tablet by mouth daily. 90 Tablet 1    ferrous sulfate 325 mg (65 mg iron) tablet Take  by mouth Daily (before breakfast). calcium carbonate (CALTREX) 600 mg calcium (1,500 mg) tablet Take 1 Tablet by mouth daily. b complex vitamins tablet Take 1 Tablet by mouth daily. cholecalciferol (VITAMIN D3) 25 mcg (1,000 unit) cap Take 1 Capsule by mouth daily. atorvastatin (LIPITOR) 40 mg tablet Take 1 Tablet by mouth nightly. 90 Tablet 1    aspirin delayed-release 81 mg tablet Take  by mouth daily. desonide (DESOWEN) 0.05 % topical ointment Apply  to affected area two (2) times a day. therapeutic multivitamin (THERAGRAN) tablet Take 1 Tablet by mouth daily. acetaminophen (TYLENOL) 650 mg TbER Take 1 Tablet by mouth every six (6) hours as needed for Pain. No Known Allergies    Objective:  Visit Vitals  /62 (BP 1 Location: Right upper arm, BP Patient Position: Sitting, BP Cuff Size: Adult)   Pulse 68   Ht 5' 2\" (1.575 m)   Wt 162 lb (73.5 kg)   SpO2 98%   BMI 29.63 kg/m²     Physical Exam:   Physical Exam  Vitals and nursing note reviewed.    Constitutional: Appearance: She is ill-appearing (chronically). Cardiovascular:      Rate and Rhythm: Normal rate and regular rhythm. Pulses: Normal pulses. Heart sounds: Normal heart sounds. Pulmonary:      Effort: Pulmonary effort is normal.      Breath sounds: Normal breath sounds. Skin:     Comments: Well delimited area of moisture and erythema underneath the breasts, no open wounds. Neurological:      Mental Status: She is alert. She is disoriented and confused. Gait: Gait abnormal (wheelchair bound due to right hemiparesis). Results for orders placed or performed in visit on 07/01/22   CBC WITH AUTOMATED DIFF   Result Value Ref Range    WBC 10.9 (H) 3.4 - 10.8 x10E3/uL    RBC 4.48 3.77 - 5.28 x10E6/uL    HGB 13.3 11.1 - 15.9 g/dL    HCT 40.5 34.0 - 46.6 %    MCV 90 79 - 97 fL    MCH 29.7 26.6 - 33.0 pg    MCHC 32.8 31.5 - 35.7 g/dL    RDW 13.2 11.7 - 15.4 %    PLATELET 245 261 - 304 x10E3/uL    NEUTROPHILS 72 Not Estab. %    Lymphocytes 16 Not Estab. %    MONOCYTES 7 Not Estab. %    EOSINOPHILS 4 Not Estab. %    BASOPHILS 1 Not Estab. %    ABS. NEUTROPHILS 8.0 (H) 1.4 - 7.0 x10E3/uL    Abs Lymphocytes 1.7 0.7 - 3.1 x10E3/uL    ABS. MONOCYTES 0.8 0.1 - 0.9 x10E3/uL    ABS. EOSINOPHILS 0.4 0.0 - 0.4 x10E3/uL    ABS. BASOPHILS 0.1 0.0 - 0.2 x10E3/uL    IMMATURE GRANULOCYTES 0 Not Estab. %    ABS. IMM. GRANS. 0.0 0.0 - 0.1 [id number]/PA   METABOLIC PANEL, COMPREHENSIVE   Result Value Ref Range    Glucose 168 (H) 70 - 99 mg/dL    BUN 18 8 - 27 mg/dL    Creatinine 1.25 (H) 0.57 - 1.00 mg/dL    eGFR 42 (L) >59 mL/min/1.73    BUN/Creatinine ratio 14 12 - 28    Sodium 143 134 - 144 mmol/L    Potassium 4.7 3.5 - 5.2 mmol/L    Chloride 101 96 - 106 mmol/L    CO2 23 20 - 29 mmol/L    Calcium 9.8 8.7 - 10.3 mg/dL    Protein, total 7.0 6.0 - 8.5 g/dL    Albumin 4.5 3.6 - 4.6 g/dL    GLOBULIN, TOTAL 2.5 1.5 - 4.5 g/dL    A-G Ratio 1.8 1.2 - 2.2    Bilirubin, total 0.6 0.0 - 1.2 mg/dL    Alk.  phosphatase 136 (H) 44 - 121 IU/L    AST (SGOT) 36 0 - 40 IU/L    ALT (SGPT) 24 0 - 32 IU/L   LIPID PANEL   Result Value Ref Range    Cholesterol, total 111 100 - 199 mg/dL    Triglyceride 228 (H) 0 - 149 mg/dL    HDL Cholesterol 45 >39 mg/dL    VLDL, calculated 35 5 - 40 mg/dL    LDL, calculated 31 0 - 99 mg/dL   TSH 3RD GENERATION   Result Value Ref Range    TSH 2.990 0.450 - 4.500 uIU/mL   VITAMIN D, 25 HYDROXY   Result Value Ref Range    VITAMIN D, 25-HYDROXY 38.4 30.0 - 100.0 ng/mL       Assessment/Plan:    I discussed with Mr. Gloria Nielson the natural history of aging and dementia, and how they eventually decreased significantly they are eating, have lack of appetite, which is something expected with progression of the dementia. I have discussed with him about considering hospice evaluation so that he can she can get the care that she needs and he can get the help that he needs. He's 80 and has his own health problemms of his own and right now is the only caregiver for Kvng. I will also send message to our care coordination team for resources. He just ordered a ramp since she's wheelchair bound and wants to know if insurance would cover some of the cost.     Time spent in visit 30 minute mainly on Educating caregiver and care coordination planning and reach out      ICD-10-CM ICD-9-CM    1. Lack of appetite  R63.0 783.0 REFERRAL TO HOSPICE      2. Failure to thrive in adult  R62.7 783.7 REFERRAL TO HOSPICE      3. History of CVA (cerebrovascular accident)  Z86.73 V12.54 REFERRAL TO HOSPICE      4. Right hemiparesis ([facility] [address]  G81.91 342.90 REFERRAL TO HOSPICE      5. Late onset Alzheimer's disease without behavioral disturbance (HCC)  G30.1 331.0 REFERRAL TO HOSPICE    F02.80 294.10       6. Requires daily assistance for activities of daily living (ADL) and comfort needs  Z74.1 V49.89 REFERRAL TO HOSPICE      7.  Intertrigo  L30.4 695.89 nystatin (MYCOSTATIN) powder        Orders Placed This [address] Referral Priority:   Routine     Referral Type:   Hospice     Referral Reason:   Continuity of Care     Referral Location:   [facility]     Number of Visits Requested:   1    nystatin (MYCOSTATIN) powder     Sig: Apply  to affected area two (2) times a day. Dispense:  60 g     Refill:  4     call if any problems  There are no Patient Instructions on file for this visit.

## 2023-04-27 NOTE — PROGRESS NOTES
Chief Complaint   Patient presents with    Follow-up     Pt not eating-but thinks teeth are bothering her    Visit Vitals  /62 (BP 1 Location: Right upper arm, BP Patient Position: Sitting, BP Cuff Size: Adult)   Pulse 68   Ht 5' 2\" (1.575 m)   Wt 162 lb (73.5 kg)   SpO2 98%   BMI 29.63 kg/m²    1. Have you been to the ER, urgent care clinic since your last visit? Hospitalized since your last visit? No    2. Have you seen or consulted any other health care providers outside of the [address] [place] [address] since your last visit? Include any pap smears or colon screening.  No

## 2023-04-28 LAB
BASOPHILS # BLD AUTO: 0 X10E3/UL (ref 0–0.2)
BASOPHILS NFR BLD AUTO: 0 %
BUN SERPL-MCNC: 26 MG/DL (ref 8–27)
BUN/CREAT SERPL: 15 (ref 12–28)
CALCIUM SERPL-MCNC: 10 MG/DL (ref 8.7–10.3)
CHLORIDE SERPL-SCNC: 98 MMOL/L (ref 96–106)
CO2 SERPL-SCNC: 23 MMOL/L (ref 20–29)
CREAT SERPL-MCNC: 1.7 MG/DL (ref 0.57–1)
EGFRCR SERPLBLD CKD-EPI 2021: 29 ML/MIN/1.73
EOSINOPHIL # BLD AUTO: 0.3 X10E3/UL (ref 0–0.4)
EOSINOPHIL NFR BLD AUTO: 3 %
ERYTHROCYTE [DISTWIDTH] IN BLOOD BY AUTOMATED COUNT: 14.6 % (ref 11.7–15.4)
GLUCOSE SERPL-MCNC: 175 MG/DL (ref 70–99)
HCT VFR BLD AUTO: 41 % (ref 34–46.6)
HGB BLD-MCNC: 13.4 G/DL (ref 11.1–15.9)
IMM GRANULOCYTES # BLD AUTO: 0 X10E3/UL (ref 0–0.1)
IMM GRANULOCYTES NFR BLD AUTO: 0 %
LYMPHOCYTES # BLD AUTO: 1.4 X10E3/UL (ref 0.7–3.1)
LYMPHOCYTES NFR BLD AUTO: 13 %
MCH RBC QN AUTO: 30.5 PG (ref 26.6–33)
MCHC RBC AUTO-ENTMCNC: 32.7 G/DL (ref 31.5–35.7)
MCV RBC AUTO: 93 FL (ref 79–97)
MONOCYTES # BLD AUTO: 0.7 X10E3/UL (ref 0.1–0.9)
MONOCYTES NFR BLD AUTO: 6 %
NEUTROPHILS # BLD AUTO: 8.4 X10E3/UL (ref 1.4–7)
NEUTROPHILS NFR BLD AUTO: 78 %
PLATELET # BLD AUTO: 337 X10E3/UL (ref 150–450)
POTASSIUM SERPL-SCNC: 3.9 MMOL/L (ref 3.5–5.2)
RBC # BLD AUTO: 4.39 X10E6/UL (ref 3.77–5.28)
SODIUM SERPL-SCNC: 141 MMOL/L (ref 134–144)
WBC # BLD AUTO: 10.8 X10E3/UL (ref 3.4–10.8)

## 2023-05-01 ENCOUNTER — PATIENT OUTREACH (OUTPATIENT)
Dept: CASE MANAGEMENT | Age: 88
End: 2023-05-01

## 2023-05-23 RX ORDER — DESONIDE 0.5 MG/G
OINTMENT TOPICAL 2 TIMES DAILY
COMMUNITY

## 2023-05-23 RX ORDER — FERROUS SULFATE 325(65) MG
TABLET ORAL
COMMUNITY

## 2023-05-23 RX ORDER — CARVEDILOL 6.25 MG/1
1 TABLET ORAL 2 TIMES DAILY WITH MEALS
COMMUNITY
Start: 2023-03-31

## 2023-05-23 RX ORDER — NYSTATIN 100000 [USP'U]/G
POWDER TOPICAL 2 TIMES DAILY
COMMUNITY
Start: 2023-04-27

## 2023-05-23 RX ORDER — ASPIRIN 81 MG/1
TABLET ORAL DAILY
COMMUNITY

## 2023-05-23 RX ORDER — ATORVASTATIN CALCIUM 40 MG/1
1 TABLET, FILM COATED ORAL NIGHTLY
COMMUNITY
Start: 2023-02-02

## 2023-05-23 RX ORDER — AMLODIPINE BESYLATE 10 MG/1
10 TABLET ORAL DAILY
COMMUNITY
Start: 2023-02-17

## 2023-05-23 RX ORDER — SENNOSIDES 8.6 MG
650 CAPSULE ORAL EVERY 6 HOURS PRN
COMMUNITY

## 2025-05-21 NOTE — PATIENT INSTRUCTIONS
BMP result routed to Dr. Reyes and Marielena CREWS     Medicare Wellness Visit, Female The best way to live healthy is to have a lifestyle where you eat a well-balanced diet, exercise regularly, limit alcohol use, and quit all forms of tobacco/nicotine, if applicable. Regular preventive services are another way to keep healthy. Preventive services (vaccines, screening tests, monitoring & exams) can help personalize your care plan, which helps you manage your own care. Screening tests can find health problems at the earliest stages, when they are easiest to treat. [facility] follows the current, evidence-based guidelines published by the [facility] ([facility]) when recommending preventive services for our patients. Because we follow these guidelines, sometimes recommendations change over time as research supports it. (For example, mammograms used to be recommended annually. Even though Medicare will still pay for an annual mammogram, the newer guidelines recommend a mammogram every two years for women of average risk). Of course, you and your doctor may decide to screen more often for some diseases, based on your risk and your co-morbidities (chronic disease you are already diagnosed with). Preventive services for you include: - Medicare offers their members a free annual wellness visit, which is time for you and your primary care provider to discuss and plan for your preventive service needs. Take advantage of this benefit every year! 
-All adults over the age of 72 should receive the recommended pneumonia vaccines. Current USPSTF guidelines recommend a series of two vaccines for the best pneumonia protection.  
-All adults should have a flu vaccine yearly and a tetanus vaccine every 10 years.  
-All adults age 48 and older should receive the shingles vaccines (series of two vaccines). -All adults age 38-68 who are overweight should have a diabetes screening test once every three years. -All adults born between [birth year] and 1965 should be screened once for Hepatitis C. 
-Other screening tests and preventive services for persons with diabetes include: an eye exam to screen for diabetic retinopathy, a kidney function test, a foot exam, and stricter control over your cholesterol.  
-Cardiovascular screening for adults with routine risk involves an electrocardiogram (ECG) at intervals determined by your doctor.  
-Colorectal cancer screenings should be done for adults age 54-65 with no increased risk factors for colorectal cancer. There are a number of acceptable methods of screening for this type of cancer. Each test has its own benefits and drawbacks. Discuss with your doctor what is most appropriate for you during your annual wellness visit. The different tests include: colonoscopy (considered the best screening method), a fecal occult blood test, a fecal DNA test, and sigmoidoscopy. 
 
-A bone mass density test is recommended when a woman turns 65 to screen for osteoporosis. This test is only recommended one time, as a screening. Some providers will use this same test as a disease monitoring tool if you already have osteoporosis. -Breast cancer screenings are recommended every other year for women of normal risk, age 54-69. 
-Cervical cancer screenings for women over age 72 are only recommended with certain risk factors. Here is a list of your current Health Maintenance items (your personalized list of preventive services) with a due date: 
Health Maintenance Due Topic Date Due  Shingles Vaccine (1 of 2) 03/16/1985  Bone Mineral Density   03/16/2000  Glaucoma Screening   08/20/2017  Flu Vaccine  08/01/2019 Sherry Annual Well Visit  02/22/2020